# Patient Record
Sex: FEMALE | Race: OTHER | Employment: UNEMPLOYED
[De-identification: names, ages, dates, MRNs, and addresses within clinical notes are randomized per-mention and may not be internally consistent; named-entity substitution may affect disease eponyms.]

---

## 2017-01-26 ENCOUNTER — OFFICE VISIT (OUTPATIENT)
Dept: FAMILY MEDICINE CLINIC | Facility: CLINIC | Age: 62
End: 2017-01-26

## 2017-01-26 VITALS
HEIGHT: 61 IN | SYSTOLIC BLOOD PRESSURE: 140 MMHG | WEIGHT: 146 LBS | TEMPERATURE: 97.5 F | DIASTOLIC BLOOD PRESSURE: 80 MMHG | HEART RATE: 95 BPM | BODY MASS INDEX: 27.56 KG/M2

## 2017-01-26 DIAGNOSIS — J30.9 CHRONIC ALLERGIC RHINITIS: Primary | ICD-10-CM

## 2017-01-26 DIAGNOSIS — G47.33 OSA ON CPAP: ICD-10-CM

## 2017-01-26 DIAGNOSIS — Z99.89 OSA ON CPAP: ICD-10-CM

## 2017-01-26 DIAGNOSIS — G47.9 SLEEP DISORDER: ICD-10-CM

## 2017-01-26 DIAGNOSIS — R05.3 PERSISTENT COUGH: ICD-10-CM

## 2017-01-26 PROCEDURE — 99213 OFFICE O/P EST LOW 20 MIN: CPT | Performed by: FAMILY MEDICINE

## 2017-01-26 RX ORDER — BENZONATATE 100 MG/1
100 CAPSULE ORAL 3 TIMES DAILY PRN
Qty: 30 CAPSULE | Refills: 1 | Status: SHIPPED | OUTPATIENT
Start: 2017-01-26 | End: 2017-02-02

## 2017-01-26 ASSESSMENT — PATIENT HEALTH QUESTIONNAIRE - PHQ9
SUM OF ALL RESPONSES TO PHQ QUESTIONS 1-9: 0
2. FEELING DOWN, DEPRESSED OR HOPELESS: 0
SUM OF ALL RESPONSES TO PHQ9 QUESTIONS 1 & 2: 0
1. LITTLE INTEREST OR PLEASURE IN DOING THINGS: 0

## 2017-01-28 ASSESSMENT — ENCOUNTER SYMPTOMS
RHINORRHEA: 1
DIARRHEA: 0
ABDOMINAL PAIN: 0
SORE THROAT: 0
SHORTNESS OF BREATH: 0
CONSTIPATION: 0
CHEST TIGHTNESS: 0
COUGH: 1
BACK PAIN: 0
NAUSEA: 0
TROUBLE SWALLOWING: 0

## 2017-03-22 ENCOUNTER — OFFICE VISIT (OUTPATIENT)
Dept: FAMILY MEDICINE CLINIC | Age: 62
End: 2017-03-22
Payer: COMMERCIAL

## 2017-03-22 VITALS
BODY MASS INDEX: 27.68 KG/M2 | DIASTOLIC BLOOD PRESSURE: 77 MMHG | SYSTOLIC BLOOD PRESSURE: 120 MMHG | HEIGHT: 61 IN | HEART RATE: 100 BPM | WEIGHT: 146.6 LBS

## 2017-03-22 DIAGNOSIS — F43.9 STRESS: ICD-10-CM

## 2017-03-22 DIAGNOSIS — G47.33 OSA ON CPAP: ICD-10-CM

## 2017-03-22 DIAGNOSIS — H92.01 OTALGIA, RIGHT: Primary | ICD-10-CM

## 2017-03-22 DIAGNOSIS — E66.09 NON MORBID OBESITY DUE TO EXCESS CALORIES: ICD-10-CM

## 2017-03-22 DIAGNOSIS — J30.89 PERENNIAL ALLERGIC RHINITIS, UNSPECIFIED ALLERGIC RHINITIS TRIGGER: ICD-10-CM

## 2017-03-22 DIAGNOSIS — G47.9 SLEEP DISORDER: ICD-10-CM

## 2017-03-22 DIAGNOSIS — Z99.89 OSA ON CPAP: ICD-10-CM

## 2017-03-22 DIAGNOSIS — R51.9 HEADACHE AROUND THE EYES: ICD-10-CM

## 2017-03-22 PROCEDURE — 99213 OFFICE O/P EST LOW 20 MIN: CPT | Performed by: FAMILY MEDICINE

## 2017-03-22 ASSESSMENT — PATIENT HEALTH QUESTIONNAIRE - PHQ9
SUM OF ALL RESPONSES TO PHQ9 QUESTIONS 1 & 2: 0
2. FEELING DOWN, DEPRESSED OR HOPELESS: 0
1. LITTLE INTEREST OR PLEASURE IN DOING THINGS: 0
SUM OF ALL RESPONSES TO PHQ QUESTIONS 1-9: 0

## 2017-03-31 PROBLEM — E66.09 NON MORBID OBESITY DUE TO EXCESS CALORIES: Status: ACTIVE | Noted: 2017-03-31

## 2017-03-31 ASSESSMENT — ENCOUNTER SYMPTOMS
SHORTNESS OF BREATH: 0
SINUS PRESSURE: 1
RHINORRHEA: 1
ABDOMINAL PAIN: 0
NAUSEA: 0
CONSTIPATION: 0
DIARRHEA: 0
VOICE CHANGE: 1
CHEST TIGHTNESS: 0
BACK PAIN: 0
COUGH: 1
SORE THROAT: 0
TROUBLE SWALLOWING: 0

## 2017-04-12 DIAGNOSIS — R51.9 HEADACHE AROUND THE EYES: ICD-10-CM

## 2017-04-24 ENCOUNTER — OFFICE VISIT (OUTPATIENT)
Dept: NEUROLOGY | Age: 62
End: 2017-04-24
Payer: COMMERCIAL

## 2017-04-24 VITALS
HEART RATE: 59 BPM | HEIGHT: 61 IN | WEIGHT: 146 LBS | SYSTOLIC BLOOD PRESSURE: 133 MMHG | DIASTOLIC BLOOD PRESSURE: 74 MMHG | BODY MASS INDEX: 27.56 KG/M2

## 2017-04-24 DIAGNOSIS — R51.9 HEADACHE AROUND THE EYES: Primary | ICD-10-CM

## 2017-04-24 PROCEDURE — 99213 OFFICE O/P EST LOW 20 MIN: CPT | Performed by: PSYCHIATRY & NEUROLOGY

## 2017-04-24 RX ORDER — CYPROHEPTADINE HYDROCHLORIDE 4 MG/1
4 TABLET ORAL 3 TIMES DAILY
Qty: 90 TABLET | Refills: 3 | Status: SHIPPED | OUTPATIENT
Start: 2017-04-24 | End: 2017-09-15 | Stop reason: SDUPTHER

## 2017-04-24 RX ORDER — LORATADINE 10 MG/1
10 TABLET ORAL DAILY
COMMUNITY
End: 2018-10-23

## 2017-06-19 DIAGNOSIS — R51.9 HEADACHE AROUND THE EYES: ICD-10-CM

## 2017-06-26 ENCOUNTER — OFFICE VISIT (OUTPATIENT)
Dept: FAMILY MEDICINE CLINIC | Age: 62
End: 2017-06-26
Payer: COMMERCIAL

## 2017-06-26 DIAGNOSIS — H69.93 EUSTACHIAN TUBE DISORDER, BILATERAL: ICD-10-CM

## 2017-06-26 DIAGNOSIS — E66.09 NON MORBID OBESITY DUE TO EXCESS CALORIES: ICD-10-CM

## 2017-06-26 DIAGNOSIS — F41.9 ANXIETY: ICD-10-CM

## 2017-06-26 DIAGNOSIS — G47.9 SLEEP DISORDER: ICD-10-CM

## 2017-06-26 DIAGNOSIS — J31.0 RHINITIS, CHRONIC: Primary | ICD-10-CM

## 2017-06-26 DIAGNOSIS — G47.33 OSA ON CPAP: ICD-10-CM

## 2017-06-26 DIAGNOSIS — Z99.89 OSA ON CPAP: ICD-10-CM

## 2017-06-26 PROCEDURE — 99213 OFFICE O/P EST LOW 20 MIN: CPT | Performed by: FAMILY MEDICINE

## 2017-06-26 ASSESSMENT — PATIENT HEALTH QUESTIONNAIRE - PHQ9
2. FEELING DOWN, DEPRESSED OR HOPELESS: 0
SUM OF ALL RESPONSES TO PHQ9 QUESTIONS 1 & 2: 0
1. LITTLE INTEREST OR PLEASURE IN DOING THINGS: 0
SUM OF ALL RESPONSES TO PHQ QUESTIONS 1-9: 0

## 2017-07-12 ENCOUNTER — OFFICE VISIT (OUTPATIENT)
Dept: NEUROLOGY | Age: 62
End: 2017-07-12
Payer: COMMERCIAL

## 2017-07-12 VITALS
BODY MASS INDEX: 29.23 KG/M2 | HEART RATE: 62 BPM | SYSTOLIC BLOOD PRESSURE: 138 MMHG | WEIGHT: 154.8 LBS | DIASTOLIC BLOOD PRESSURE: 75 MMHG | HEIGHT: 61 IN

## 2017-07-12 DIAGNOSIS — R51.9 HEADACHE AROUND THE EYES: Primary | ICD-10-CM

## 2017-07-12 PROCEDURE — 99213 OFFICE O/P EST LOW 20 MIN: CPT | Performed by: PSYCHIATRY & NEUROLOGY

## 2017-07-14 ASSESSMENT — ENCOUNTER SYMPTOMS
CONSTIPATION: 0
NAUSEA: 0
DIARRHEA: 0
TROUBLE SWALLOWING: 0
BACK PAIN: 0
SHORTNESS OF BREATH: 1
ABDOMINAL PAIN: 0
CHEST TIGHTNESS: 0
RHINORRHEA: 0
SORE THROAT: 0
COUGH: 1

## 2017-09-15 DIAGNOSIS — R51.9 HEADACHE AROUND THE EYES: ICD-10-CM

## 2017-09-18 RX ORDER — CYPROHEPTADINE HYDROCHLORIDE 4 MG/1
4 TABLET ORAL 3 TIMES DAILY
Qty: 90 TABLET | Refills: 2 | OUTPATIENT
Start: 2017-09-18 | End: 2017-12-28 | Stop reason: ALTCHOICE

## 2017-10-24 ENCOUNTER — OFFICE VISIT (OUTPATIENT)
Dept: FAMILY MEDICINE CLINIC | Age: 62
End: 2017-10-24
Payer: COMMERCIAL

## 2017-10-24 VITALS
HEART RATE: 63 BPM | BODY MASS INDEX: 28.77 KG/M2 | DIASTOLIC BLOOD PRESSURE: 73 MMHG | WEIGHT: 152.4 LBS | HEIGHT: 61 IN | SYSTOLIC BLOOD PRESSURE: 131 MMHG

## 2017-10-24 DIAGNOSIS — E66.09 NON MORBID OBESITY DUE TO EXCESS CALORIES: ICD-10-CM

## 2017-10-24 DIAGNOSIS — J30.1 CHRONIC SEASONAL ALLERGIC RHINITIS DUE TO POLLEN: Primary | ICD-10-CM

## 2017-10-24 DIAGNOSIS — Z99.89 OSA ON CPAP: ICD-10-CM

## 2017-10-24 DIAGNOSIS — H69.83 DYSFUNCTION OF BOTH EUSTACHIAN TUBES: ICD-10-CM

## 2017-10-24 DIAGNOSIS — G47.33 OSA ON CPAP: ICD-10-CM

## 2017-10-24 PROCEDURE — G8484 FLU IMMUNIZE NO ADMIN: HCPCS | Performed by: FAMILY MEDICINE

## 2017-10-24 PROCEDURE — 1036F TOBACCO NON-USER: CPT | Performed by: FAMILY MEDICINE

## 2017-10-24 PROCEDURE — G8417 CALC BMI ABV UP PARAM F/U: HCPCS | Performed by: FAMILY MEDICINE

## 2017-10-24 PROCEDURE — 3017F COLORECTAL CA SCREEN DOC REV: CPT | Performed by: FAMILY MEDICINE

## 2017-10-24 PROCEDURE — 3014F SCREEN MAMMO DOC REV: CPT | Performed by: FAMILY MEDICINE

## 2017-10-24 PROCEDURE — 99213 OFFICE O/P EST LOW 20 MIN: CPT | Performed by: FAMILY MEDICINE

## 2017-10-24 PROCEDURE — G8427 DOCREV CUR MEDS BY ELIG CLIN: HCPCS | Performed by: FAMILY MEDICINE

## 2017-10-24 ASSESSMENT — ENCOUNTER SYMPTOMS
COUGH: 0
BACK PAIN: 0
ABDOMINAL PAIN: 0
RHINORRHEA: 1
CHEST TIGHTNESS: 0
CONSTIPATION: 0
VOICE CHANGE: 0
SHORTNESS OF BREATH: 0
SORE THROAT: 0
DIARRHEA: 0
NAUSEA: 0
TROUBLE SWALLOWING: 0

## 2017-10-24 NOTE — PROGRESS NOTES
[address]  [facility] [address]  Dept: [phone]  Dept Fax: [phone]    Fredy Rutledge is a 63 y.o. female who presents today for her medical conditions/complaints as noted below. Fredy Rutledge is c/o of Allergic Rhinitis  (pt here for 4 mth follow-up of chronic rhinitis)      HPI:     Patient is here for follow-up of her medical problems. Patient states she has been feeling well. She is taking her medications. She is seeing her allergist.  Denies any headaches or dizziness. No chest pain. Weight is up 6 pounds. Blood pressure is stable. Nonsmoker. Denies alcohol. Social History   Substance Use Topics    Smoking status: Never Smoker    Smokeless tobacco: Never Used    Alcohol use No      Current Outpatient Prescriptions   Medication Sig Dispense Refill    cyproheptadine (PERIACTIN) 4 MG tablet Take 1 tablet by mouth 3 times daily 90 tablet 2    loratadine (CLARITIN) 10 MG tablet Take 10 mg by mouth daily       No current facility-administered medications for this visit. No Known Allergies      Subjective:      Review of Systems   Constitutional: Negative for chills, diaphoresis, fatigue and fever. HENT: Positive for congestion and rhinorrhea. Negative for ear pain, hearing loss, sore throat, trouble swallowing and voice change. Eyes: Negative for visual disturbance. Respiratory: Negative for cough, chest tightness and shortness of breath. Cardiovascular: Negative for chest pain, palpitations and leg swelling. Gastrointestinal: Negative for abdominal pain, constipation, diarrhea and nausea. Endocrine: Negative for polydipsia and polyuria. Genitourinary: Negative for difficulty urinating, dysuria and hematuria. Musculoskeletal: Negative for arthralgias, back pain, gait problem and neck pain. Skin: Negative for rash. Neurological: Negative for dizziness, light-headedness and headaches.

## 2017-10-24 NOTE — PROGRESS NOTES
Visit Information    Have you changed or started any medications since your last visit including any over-the-counter medicines, vitamins, or herbal medicines? no   Are you having any side effects from any of your medications? -  no  Have you stopped taking any of your medications? Is so, why? -  no    Have you seen any other physician or provider since your last visit? Yes - Records Obtained  Have you had any other diagnostic tests since your last visit? No  Have you been seen in the emergency room and/or had an admission to a hospital since we last saw you? No  Have you had your routine dental cleaning in the past 6 months? no    Have you activated your [employer] account? If not, what are your barriers?  Yes     Patient Care Team:  Tangela Estrada MD as PCP - General (Family Medicine)    Medical History Review  Past Medical, Family, and Social History reviewed and does contribute to the patient presenting condition    Health Maintenance   Topic Date Due    Diabetes screen  03/04/1997    Colon cancer screen colonoscopy  03/04/2007    Zostavax vaccine  03/04/2017    Flu vaccine (1) 12/19/2017 (Originally 9/1/2017)    HIV screen  03/26/2018 (Originally 3/4/1972)    DTaP/Tdap/Td vaccine (1 - Tdap) 06/23/2018 (Originally 3/4/1976)    Lipid screen  06/26/2018 (Originally 3/4/1997)    Hepatitis C screen  06/26/2018 (Originally 1955)    Breast cancer screen  03/10/2018    Cervical cancer screen  03/30/2018

## 2017-12-28 ENCOUNTER — OFFICE VISIT (OUTPATIENT)
Dept: NEUROLOGY | Age: 62
End: 2017-12-28
Payer: COMMERCIAL

## 2017-12-28 VITALS
BODY MASS INDEX: 29.11 KG/M2 | HEIGHT: 61 IN | WEIGHT: 154.2 LBS | DIASTOLIC BLOOD PRESSURE: 70 MMHG | HEART RATE: 63 BPM | SYSTOLIC BLOOD PRESSURE: 135 MMHG

## 2017-12-28 DIAGNOSIS — G44.099 TRIGEMINAL AUTONOMIC CEPHALGIAS: ICD-10-CM

## 2017-12-28 DIAGNOSIS — G47.33 OSA (OBSTRUCTIVE SLEEP APNEA): Primary | ICD-10-CM

## 2017-12-28 PROCEDURE — 3017F COLORECTAL CA SCREEN DOC REV: CPT | Performed by: PSYCHIATRY & NEUROLOGY

## 2017-12-28 PROCEDURE — G8417 CALC BMI ABV UP PARAM F/U: HCPCS | Performed by: PSYCHIATRY & NEUROLOGY

## 2017-12-28 PROCEDURE — 3014F SCREEN MAMMO DOC REV: CPT | Performed by: PSYCHIATRY & NEUROLOGY

## 2017-12-28 PROCEDURE — 1036F TOBACCO NON-USER: CPT | Performed by: PSYCHIATRY & NEUROLOGY

## 2017-12-28 PROCEDURE — G8427 DOCREV CUR MEDS BY ELIG CLIN: HCPCS | Performed by: PSYCHIATRY & NEUROLOGY

## 2017-12-28 PROCEDURE — 99213 OFFICE O/P EST LOW 20 MIN: CPT | Performed by: PSYCHIATRY & NEUROLOGY

## 2017-12-28 PROCEDURE — G8484 FLU IMMUNIZE NO ADMIN: HCPCS | Performed by: PSYCHIATRY & NEUROLOGY

## 2018-03-04 ENCOUNTER — APPOINTMENT (OUTPATIENT)
Dept: GENERAL RADIOLOGY | Age: 63
End: 2018-03-04
Payer: COMMERCIAL

## 2018-03-04 ENCOUNTER — HOSPITAL ENCOUNTER (EMERGENCY)
Age: 63
Discharge: HOME | End: 2018-03-04
Payer: COMMERCIAL

## 2018-03-04 VITALS
BODY MASS INDEX: 28.47 KG/M2 | SYSTOLIC BLOOD PRESSURE: 148 MMHG | DIASTOLIC BLOOD PRESSURE: 81 MMHG | RESPIRATION RATE: 14 BRPM | OXYGEN SATURATION: 100 % | HEIGHT: 61 IN | WEIGHT: 150.79 LBS | TEMPERATURE: 97.9 F | HEART RATE: 65 BPM

## 2018-03-04 DIAGNOSIS — M25.561 RIGHT KNEE PAIN, UNSPECIFIED CHRONICITY: Primary | ICD-10-CM

## 2018-03-04 DIAGNOSIS — M25.461 KNEE EFFUSION, RIGHT: ICD-10-CM

## 2018-03-04 PROCEDURE — 99283 EMERGENCY DEPT VISIT LOW MDM: CPT

## 2018-03-04 PROCEDURE — 73562 X-RAY EXAM OF KNEE 3: CPT

## 2018-03-04 RX ORDER — IBUPROFEN 800 MG/1
800 TABLET ORAL EVERY 8 HOURS PRN
Qty: 15 TABLET | Refills: 0 | Status: SHIPPED | OUTPATIENT
Start: 2018-03-04 | End: 2018-09-25 | Stop reason: SDUPTHER

## 2018-03-04 ASSESSMENT — ENCOUNTER SYMPTOMS
SHORTNESS OF BREATH: 0
COLOR CHANGE: 0
COUGH: 0

## 2018-03-04 ASSESSMENT — PAIN SCALES - GENERAL: PAINLEVEL_OUTOF10: 5

## 2018-03-04 NOTE — ED PROVIDER NOTES
DISPOSITION Decision To Discharge 03/04/2018 06:50:45 PM      PATIENT REFERRED TO:   [facility] [address] [facility], [address] [facility] [address]  Damari 6957, DO  [address] [place] [address] [facility]  [phone]    Schedule an appointment as soon as possible for a visit       [facility] ED  [address] [facility]  [phone]    If symptoms worsen      DISCHARGE MEDICATIONS:     Discharge Medication List as of 3/4/2018  6:51 PM      START taking these medications    Details   ibuprofen (ADVIL;MOTRIN) 800 MG tablet Take 1 tablet by mouth every 8 hours as needed for Pain or Fever, Disp-15 tablet, R-0Print                 (Please note that portions of this note were completed with a voice recognition program.  Efforts were made to edit the dictations but occasionally words are mis-transcribed.)    LUCI Goff NP  03/04/18 [id number]

## 2018-03-08 ENCOUNTER — OFFICE VISIT (OUTPATIENT)
Dept: FAMILY MEDICINE CLINIC | Age: 63
End: 2018-03-08
Payer: COMMERCIAL

## 2018-03-08 VITALS
BODY MASS INDEX: 28.25 KG/M2 | HEIGHT: 61 IN | SYSTOLIC BLOOD PRESSURE: 117 MMHG | HEART RATE: 69 BPM | DIASTOLIC BLOOD PRESSURE: 68 MMHG | WEIGHT: 149.6 LBS

## 2018-03-08 DIAGNOSIS — M25.561 ACUTE PAIN OF RIGHT KNEE: Primary | ICD-10-CM

## 2018-03-08 PROCEDURE — 99213 OFFICE O/P EST LOW 20 MIN: CPT | Performed by: PHYSICIAN ASSISTANT

## 2018-03-08 ASSESSMENT — ENCOUNTER SYMPTOMS
VOMITING: 0
SHORTNESS OF BREATH: 0
COUGH: 1
NAUSEA: 0
BACK PAIN: 0

## 2018-03-08 NOTE — PROGRESS NOTES
Vitals:    03/08/18 1610   BP: 117/68   Site: Left Arm   Position: Sitting   Cuff Size: Medium Adult   Pulse: 69   Weight: 149 lb 9.6 oz (67.9 kg)   Height: 5' 0.98\" (1.549 m)       GENERAL PHYSICAL EXAM     Physical Exam   Constitutional: She is oriented to person, place, and time and well-developed, well-nourished, and in no distress. HENT:   Head: Normocephalic and atraumatic. Eyes: Pupils are equal, round, and reactive to light. Cardiovascular: Normal rate, regular rhythm and normal heart sounds. Pulmonary/Chest: Effort normal and breath sounds normal.   Abdominal: Soft. She exhibits no mass. There is no tenderness. Musculoskeletal: Normal range of motion. She exhibits no edema. Right knee: She exhibits swelling and bony tenderness. Tenderness found. Neurological: She is alert and oriented to person, place, and time. Gait normal.   Skin: Skin is warm and dry. Assessment     1. Acute pain of right knee         Plan      Long discussion with patient, Patient is here for follow-up for ER visit for right knee pain, x-ray was negative, 3 out of 10 intensity, patient states pain medication has provided significant relief to right knee pain. Findings were explained, blood pressure stable, right knee edema and tenderness. Plan of treatment, discussed right knee pain. Instructed patient to continue take pain medication to control pain, ice knee and elevate leg. Instructed patient to contact Ortho and right knee pain does not improve. Her medications were reviewed. Labs reviewed. We will see patient back in about 6 months or earlier if any problems. No orders of the defined types were placed in this encounter.     Outpatient Encounter Prescriptions as of 3/8/2018   Medication Sig Dispense Refill    ibuprofen (ADVIL;MOTRIN) 800 MG tablet Take 1 tablet by mouth every 8 hours as needed for Pain or Fever 15 tablet 0    Omega-3 Fatty Acids (OMEGA 3 PO) Take by mouth daily      CALCIUM PO Take by mouth daily      MULTIPLE VITAMIN PO Take by mouth daily      loratadine (CLARITIN) 10 MG tablet Take 10 mg by mouth daily       No facility-administered encounter medications on file as of 3/8/2018. Medications, laboratory testing, imaging, consultation, and follow up as documented in this encounter.      Electronically signed by Hakeem Duke PA-C on 3/8/18 at 4:52 PM

## 2018-03-08 NOTE — PATIENT INSTRUCTIONS
Patient Education        Chronic Sinusitis: Care Instructions  Your Care Instructions    Sinusitis is an infection of the lining of the sinus cavities in your head. It causes pain and pressure in your head and face. Sinusitis can be short-term (acute) or long-term (chronic). Chronic sinusitis lasts 12 weeks or longer. It is often caused by a bacterial or fungal infection. Other things, such as allergies, may also be involved. Chronic sinusitis may be hard to treat. It can lead to permanent changes in the mucous membranes that line the sinuses. It may make future sinus infections more likely. The infection may take some time to treat. Antibiotics are usually used if the infection is caused by bacteria. You may also need to use a corticosteroid nasal spray. If the infection is not cured after you try two or more different antibiotics, you may want to talk with your doctor about surgery or allergy testing. If the sinusitis is caused by a fungal infection, you may need to take antifungals or other medicines. You may also need surgery. Follow-up care is a key part of your treatment and safety. Be sure to make and go to all appointments, and call your doctor if you are having problems. It's also a good idea to know your test results and keep a list of the medicines you take. How can you care for yourself at home? Medicines  ? · Be safe with medicines. Take your medicines exactly as prescribed. Call your doctor if you think you are having a problem with your medicine. You will get more details on the specific medicines your doctor prescribes. ? · Take your antibiotics as directed. Do not stop taking them just because you feel better. You need to take the full course of antibiotics. ? · Your doctor may recommend a corticosteroid nasal spray, wash, drops, or pills. Take this medicine exactly as prescribed. ?At home  ? · Breathe warm, moist air.  You can use a steamy shower, a hot bath, or a sink filled with hot water. Avoid cold, dry air. Using a humidifier in your home may help. Follow the instructions for cleaning the machine. ? · Use saline (saltwater) nasal washes every day. This helps keep your nasal passages open. It also can wash out mucus and bacteria. ¨ You can buy saline nose drops at a grocery store or drugstore. ¨ You can make your own at home. Add 1 teaspoon of salt and 1 teaspoon of baking soda to 2 cups of distilled water. If you make your own, fill a bulb syringe with the solution. Then insert the tip into your nostril and squeeze gently. Chava Lute your nose. ? · Put a warm, wet towel or a warm gel pack on your face 3 or 4 times a day. Leave it on 5 to 10 minutes each time. ? · Do not smoke or breathe secondhand smoke. Smoking can make sinusitis worse. If you need help quitting, talk to your doctor about stop-smoking programs and medicines. These can increase your chances of quitting for good. When should you call for help? Call your doctor now or seek immediate medical care if:  ? · You have new or worse symptoms of infection, such as:  ¨ Increased pain, swelling, warmth, or redness. ¨ Red streaks leading from the area. ¨ Pus draining from the area. ¨ A fever. ? Watch closely for changes in your health, and be sure to contact your doctor if:  ? · The mucus from your nose becomes thicker (like pus) or has new blood in it. ? · You do not get better as expected. Where can you learn more? Go to [email or web address]://ACTION SPORTSpeYi De.Tamarac. org and sign in to your "[employer] account. Enter H033 in the MyLikes box to learn more about \"Chronic Sinusitis: Care Instructions. \"     If you do not have an account, please click on the \"Sign Up Now\" link. Current as of: May 12, 2017  Content Version: 11.5  © 4820-1658 Healthwise, Incorporated. Care instructions adapted under license by [facility] ([facility]).  If you have questions about a medical condition or this instruction, always ask your healthcare professional. [address] any warranty or liability for your use of this information.

## 2018-05-11 ENCOUNTER — TELEPHONE (OUTPATIENT)
Dept: FAMILY MEDICINE CLINIC | Age: 63
End: 2018-05-11

## 2018-05-14 ENCOUNTER — TELEPHONE (OUTPATIENT)
Dept: FAMILY MEDICINE CLINIC | Age: 63
End: 2018-05-14

## 2018-07-18 ENCOUNTER — TELEPHONE (OUTPATIENT)
Dept: FAMILY MEDICINE CLINIC | Age: 63
End: 2018-07-18

## 2018-08-29 ENCOUNTER — OFFICE VISIT (OUTPATIENT)
Dept: FAMILY MEDICINE CLINIC | Age: 63
End: 2018-08-29
Payer: COMMERCIAL

## 2018-08-29 VITALS
HEIGHT: 61 IN | RESPIRATION RATE: 17 BRPM | BODY MASS INDEX: 25.86 KG/M2 | HEART RATE: 83 BPM | OXYGEN SATURATION: 98 % | WEIGHT: 137 LBS | DIASTOLIC BLOOD PRESSURE: 70 MMHG | TEMPERATURE: 99.7 F | SYSTOLIC BLOOD PRESSURE: 126 MMHG

## 2018-08-29 DIAGNOSIS — J01.90 ACUTE NON-RECURRENT SINUSITIS, UNSPECIFIED LOCATION: Primary | ICD-10-CM

## 2018-08-29 DIAGNOSIS — R04.2 COUGH WITH HEMOPTYSIS: ICD-10-CM

## 2018-08-29 PROCEDURE — G8427 DOCREV CUR MEDS BY ELIG CLIN: HCPCS | Performed by: NURSE PRACTITIONER

## 2018-08-29 PROCEDURE — 1036F TOBACCO NON-USER: CPT | Performed by: NURSE PRACTITIONER

## 2018-08-29 PROCEDURE — 3017F COLORECTAL CA SCREEN DOC REV: CPT | Performed by: NURSE PRACTITIONER

## 2018-08-29 PROCEDURE — G8417 CALC BMI ABV UP PARAM F/U: HCPCS | Performed by: NURSE PRACTITIONER

## 2018-08-29 PROCEDURE — 99201 PR OFFICE OUTPATIENT NEW 10 MINUTES: CPT | Performed by: NURSE PRACTITIONER

## 2018-08-29 RX ORDER — BENZONATATE 100 MG/1
100 CAPSULE ORAL 3 TIMES DAILY PRN
Qty: 20 CAPSULE | Refills: 0 | Status: SHIPPED | OUTPATIENT
Start: 2018-08-29 | End: 2018-09-16

## 2018-08-29 RX ORDER — AMOXICILLIN AND CLAVULANATE POTASSIUM 875; 125 MG/1; MG/1
1 TABLET, FILM COATED ORAL 2 TIMES DAILY
Qty: 20 TABLET | Refills: 0 | Status: SHIPPED | OUTPATIENT
Start: 2018-08-29 | End: 2018-09-08

## 2018-08-29 ASSESSMENT — ENCOUNTER SYMPTOMS
WHEEZING: 1
VOMITING: 0
SINUS PAIN: 1
DIARRHEA: 0
SHORTNESS OF BREATH: 0
COUGH: 1
SINUS PRESSURE: 1

## 2018-08-29 ASSESSMENT — PATIENT HEALTH QUESTIONNAIRE - PHQ9
2. FEELING DOWN, DEPRESSED OR HOPELESS: 0
SUM OF ALL RESPONSES TO PHQ QUESTIONS 1-9: 0
SUM OF ALL RESPONSES TO PHQ QUESTIONS 1-9: 0
1. LITTLE INTEREST OR PLEASURE IN DOING THINGS: 0
SUM OF ALL RESPONSES TO PHQ9 QUESTIONS 1 & 2: 0

## 2018-08-29 NOTE — PROGRESS NOTES
[address] [address]  [place] [address]  Dept: [phone]  Dept Fax: [phone]    Altagracia Aguilar is a 66 y.o. female who presents to the urgent care today for her medical conditions/complaints as noted below. Altagracia Aguilar is c/o of Cough (x 1 week, worsening , thick bloody sputum); Chest Congestion (x 1 week ); Wheezing (x 1 week ); Nasal Congestion (x 1 week, clear drainage ); Ear Fullness (Bilateral ); and Headache (congestion)      HPI:     Patient states she has sinus pressure, ear pain, cough for one week. States she feels facial pressure, sinus headache. States she has post nasal drip and hacks up yellow, bloody drainage in am.  States does not feel like it was from chest  Denies fever, wt loss, night sweats, leg swelling  Bleeding disorder, cp, sob, nv, diarrhea  Denies rash, recent travel, back pain  Feels like she has a sinus infection. States it is in her sinuses, not as much in chest      Cough   This is a new problem. The current episode started in the past 7 days. The problem has been waxing and waning. The cough is productive of sputum. Associated symptoms include ear congestion, ear pain, headaches, nasal congestion, postnasal drip and wheezing. Pertinent negatives include no chest pain, chills, fever, rash or shortness of breath. The symptoms are aggravated by lying down (worse in am). She has tried nothing for the symptoms. Wheezing    Associated symptoms include coughing, ear pain and headaches. Pertinent negatives include no chest pain, chills, diarrhea, fever, neck pain, rash, shortness of breath or vomiting. Ear Fullness    Associated symptoms include coughing and headaches. Pertinent negatives include no diarrhea, neck pain, rash or vomiting. Headache    Associated symptoms include coughing, ear pain and sinus pressure. Pertinent negatives include no dizziness, fever, neck pain or vomiting.        Past xray ordered  You need rechecked tomorrow  Go to er for coughing up blood, fever, chills, chest pain, short of breath, worsening, change or concern  Follow up with primary care, call in am    Return for follow up with primary care in one day, go to the ER for worsening, change or concern. Orders Placed This Encounter   Medications    amoxicillin-clavulanate (AUGMENTIN) 875-125 MG per tablet     Sig: Take 1 tablet by mouth 2 times daily for 10 days     Dispense:  20 tablet     Refill:  0    benzonatate (TESSALON PERLES) 100 MG capsule     Sig: Take 1 capsule by mouth 3 times daily as needed for Cough     Dispense:  20 capsule     Refill:  0         Patient given educational materials - see patient instructions. Discussed use, benefit, and side effects of prescribed medications. All patient questions answered. Pt voiced understanding.     Electronically signed by SOL Raphael CNP on 8/29/2018 at 7:38 PM

## 2018-08-29 NOTE — PATIENT INSTRUCTIONS
drugstore. Or you can make your own at home by adding 1 teaspoon of salt and 1 teaspoon of baking soda to 2 cups of distilled water. If you make your own, fill a bulb syringe with the solution, insert the tip into your nostril, and squeeze gently. Herbie Branch your nose. · Put a hot, wet towel or a warm gel pack on your face 3 or 4 times a day for 5 to 10 minutes each time. · Try a decongestant nasal spray like oxymetazoline (Afrin). Do not use it for more than 3 days in a row. Using it for more than 3 days can make your congestion worse. When should you call for help? Call your doctor now or seek immediate medical care if:    · You have new or worse swelling or redness in your face or around your eyes.     · You have a new or higher fever.    Watch closely for changes in your health, and be sure to contact your doctor if:    · You have new or worse facial pain.     · The mucus from your nose becomes thicker (like pus) or has new blood in it.     · You are not getting better as expected. Where can you learn more? Go to https://Sagence.RoboteX. org and sign in to your [employer]by [employer]) account. Enter Y010 in the [employer] [facility] box to learn more about \"Sinusitis: Care Instructions. \"     If you do not have an account, please click on the \"Sign Up Now\" link. Current as of: May 12, 2017  Content Version: 11.7  © 9730-5371 [employer]. Care instructions adapted under license by [facility] ([facility]). If you have questions about a medical condition or this instruction, always ask your healthcare professional. [address] [address] any warranty or liability for your use of this information. Patient Education        Coughing Up Blood: Care Instructions  Your Care Instructions    Coughing up blood can be frightening. The blood may come from the lungs, stomach, or throat. You may cough up a few thin streaks of bright red blood. This probably is not a cause for concern.  Coughing up large call for help? Call 911 anytime you think you may need emergency care. For example, call if:    · You have sudden chest pain and shortness of breath.     · You have severe trouble breathing.    Call your doctor now or seek immediate medical care if:    · You have wheezing and difficulty breathing.     · You are dizzy or lightheaded, or you feel like you may faint.     · You cough up clots of blood.    Watch closely for changes in your health, and be sure to contact your doctor if:    · You do not get better as expected.     · You have any new symptoms, such as chest pain with difficulty breathing or a fever. Where can you learn more? Go to https://GMG33.Instaradio. org and sign in to your [employer] account. Enter M550 in the EnhanCV box to learn more about \"Coughing Up Blood: Care Instructions. \"     If you do not have an account, please click on the \"Sign Up Now\" link. Current as of: November 20, 2017  Content Version: 11.7  © 8367-9950 [employer]raSpan, Incorporated. Care instructions adapted under license by [facility] [address] If you have questions about a medical condition or this instruction, always ask your healthcare professional. [address] [address] any warranty or liability for your use of this information.

## 2018-09-16 ENCOUNTER — HOSPITAL ENCOUNTER (EMERGENCY)
Age: 63
Discharge: HOME | End: 2018-09-16
Attending: EMERGENCY MEDICINE
Payer: COMMERCIAL

## 2018-09-16 VITALS
DIASTOLIC BLOOD PRESSURE: 61 MMHG | WEIGHT: 138 LBS | OXYGEN SATURATION: 97 % | SYSTOLIC BLOOD PRESSURE: 153 MMHG | BODY MASS INDEX: 26.06 KG/M2 | TEMPERATURE: 97.8 F | RESPIRATION RATE: 16 BRPM | HEIGHT: 61 IN | HEART RATE: 92 BPM

## 2018-09-16 DIAGNOSIS — J06.9 UPPER RESPIRATORY TRACT INFECTION, UNSPECIFIED TYPE: ICD-10-CM

## 2018-09-16 DIAGNOSIS — H66.91 RIGHT OTITIS MEDIA, UNSPECIFIED OTITIS MEDIA TYPE: Primary | ICD-10-CM

## 2018-09-16 PROCEDURE — 99283 EMERGENCY DEPT VISIT LOW MDM: CPT

## 2018-09-16 RX ORDER — CEFDINIR 300 MG/1
300 CAPSULE ORAL 2 TIMES DAILY
Qty: 20 CAPSULE | Refills: 0 | Status: SHIPPED | OUTPATIENT
Start: 2018-09-16 | End: 2018-09-26

## 2018-09-16 RX ORDER — FLUTICASONE PROPIONATE 50 MCG
1 SPRAY, SUSPENSION (ML) NASAL DAILY
Qty: 1 BOTTLE | Refills: 0 | Status: SHIPPED | OUTPATIENT
Start: 2018-09-16 | End: 2019-03-08 | Stop reason: ALTCHOICE

## 2018-09-16 RX ORDER — PROMETHAZINE HYDROCHLORIDE 6.25 MG/5ML
6.25 SYRUP ORAL 4 TIMES DAILY PRN
Qty: 140 ML | Refills: 0 | Status: SHIPPED | OUTPATIENT
Start: 2018-09-16 | End: 2018-09-25 | Stop reason: SDUPTHER

## 2018-09-16 RX ORDER — BENZONATATE 200 MG/1
200 CAPSULE ORAL 3 TIMES DAILY PRN
Qty: 30 CAPSULE | Refills: 0 | Status: SHIPPED | OUTPATIENT
Start: 2018-09-16 | End: 2018-09-25 | Stop reason: SDUPTHER

## 2018-09-16 ASSESSMENT — ENCOUNTER SYMPTOMS
COUGH: 1
COLOR CHANGE: 0
SINUS PRESSURE: 0
RHINORRHEA: 1
SHORTNESS OF BREATH: 0
TROUBLE SWALLOWING: 0
SORE THROAT: 1
VOICE CHANGE: 0

## 2018-09-16 ASSESSMENT — PAIN SCALES - GENERAL: PAINLEVEL_OUTOF10: 5

## 2018-09-16 ASSESSMENT — PAIN DESCRIPTION - LOCATION: LOCATION: THROAT

## 2018-09-25 ENCOUNTER — OFFICE VISIT (OUTPATIENT)
Dept: FAMILY MEDICINE CLINIC | Age: 63
End: 2018-09-25
Payer: COMMERCIAL

## 2018-09-25 VITALS
SYSTOLIC BLOOD PRESSURE: 135 MMHG | OXYGEN SATURATION: 96 % | DIASTOLIC BLOOD PRESSURE: 80 MMHG | HEART RATE: 84 BPM | HEIGHT: 61 IN | TEMPERATURE: 98 F | RESPIRATION RATE: 20 BRPM | BODY MASS INDEX: 25.49 KG/M2 | WEIGHT: 135 LBS

## 2018-09-25 DIAGNOSIS — H92.01 OTALGIA OF RIGHT EAR: Primary | ICD-10-CM

## 2018-09-25 DIAGNOSIS — R05.9 COUGH: ICD-10-CM

## 2018-09-25 DIAGNOSIS — R09.81 NASAL SINUS CONGESTION: ICD-10-CM

## 2018-09-25 DIAGNOSIS — Z00.00 HEALTHCARE MAINTENANCE: ICD-10-CM

## 2018-09-25 PROCEDURE — G8417 CALC BMI ABV UP PARAM F/U: HCPCS | Performed by: PHYSICIAN ASSISTANT

## 2018-09-25 PROCEDURE — 3017F COLORECTAL CA SCREEN DOC REV: CPT | Performed by: PHYSICIAN ASSISTANT

## 2018-09-25 PROCEDURE — 1036F TOBACCO NON-USER: CPT | Performed by: PHYSICIAN ASSISTANT

## 2018-09-25 PROCEDURE — G8427 DOCREV CUR MEDS BY ELIG CLIN: HCPCS | Performed by: PHYSICIAN ASSISTANT

## 2018-09-25 PROCEDURE — 99213 OFFICE O/P EST LOW 20 MIN: CPT | Performed by: PHYSICIAN ASSISTANT

## 2018-09-25 RX ORDER — PROMETHAZINE HYDROCHLORIDE 6.25 MG/5ML
6.25 SYRUP ORAL 4 TIMES DAILY PRN
Qty: 140 ML | Refills: 0 | Status: SHIPPED | OUTPATIENT
Start: 2018-09-25 | End: 2018-10-02

## 2018-09-25 RX ORDER — NEOMYCIN SULFATE, POLYMYXIN B SULFATE, HYDROCORTISONE 3.5; 10000; 1 MG/ML; [USP'U]/ML; MG/ML
2 SOLUTION/ DROPS AURICULAR (OTIC) 2 TIMES DAILY
Qty: 1 BOTTLE | Refills: 0 | Status: SHIPPED | OUTPATIENT
Start: 2018-09-25 | End: 2018-10-02

## 2018-09-25 RX ORDER — BENZONATATE 200 MG/1
200 CAPSULE ORAL 3 TIMES DAILY PRN
Qty: 30 CAPSULE | Refills: 0 | Status: SHIPPED | OUTPATIENT
Start: 2018-09-25 | End: 2018-10-05

## 2018-09-25 RX ORDER — IBUPROFEN 800 MG/1
800 TABLET ORAL EVERY 8 HOURS PRN
Qty: 30 TABLET | Refills: 0 | Status: SHIPPED | OUTPATIENT
Start: 2018-09-25 | End: 2019-03-08 | Stop reason: ALTCHOICE

## 2018-09-25 ASSESSMENT — PATIENT HEALTH QUESTIONNAIRE - PHQ9
SUM OF ALL RESPONSES TO PHQ9 QUESTIONS 1 & 2: 0
SUM OF ALL RESPONSES TO PHQ QUESTIONS 1-9: 0
2. FEELING DOWN, DEPRESSED OR HOPELESS: 0
SUM OF ALL RESPONSES TO PHQ QUESTIONS 1-9: 0
1. LITTLE INTEREST OR PLEASURE IN DOING THINGS: 0

## 2018-09-25 ASSESSMENT — ENCOUNTER SYMPTOMS
RHINORRHEA: 1
COUGH: 1
SORE THROAT: 1

## 2018-09-25 NOTE — PROGRESS NOTES
Pressure Mother     Migraines Mother     Alzheimer's Disease Father         REVIEW OF SYSTEMS:  Review of Systems   Constitutional: Positive for chills. Negative for fever. HENT: Positive for ear pain, rhinorrhea and sore throat. Negative for congestion. Respiratory: Positive for cough. Negative for shortness of breath. Cardiovascular: Negative for chest pain. Gastrointestinal: Negative for constipation, diarrhea, nausea and vomiting. Genitourinary: Negative for dysuria, frequency and urgency. Musculoskeletal: Negative for back pain, joint pain, myalgias and neck pain. Neurological: Negative for headaches. PHYSICAL EXAM:  Vitals:    09/25/18 1629   BP: 135/80   Site: Left Upper Arm   Position: Sitting   Cuff Size: Medium Adult   Pulse: 84   Resp: 20   Temp: 98 °F (36.7 °C)   TempSrc: Oral   SpO2: 96%   Weight: 135 lb (61.2 kg)   Height: 5' 1\" (1.549 m)     BP Readings from Last 3 Encounters:   09/25/18 135/80   09/16/18 (!) 153/61   08/29/18 126/70        Physical Exam   Constitutional: She is oriented to person, place, and time and well-developed, well-nourished, and in no distress. HENT:   Head: Normocephalic and atraumatic. Right Ear: Hearing, tympanic membrane, external ear and ear canal normal. No drainage or tenderness. No middle ear effusion. Left Ear: Hearing, tympanic membrane, external ear and ear canal normal. No drainage or tenderness. No middle ear effusion. Mouth/Throat: Uvula is midline, oropharynx is clear and moist and mucous membranes are normal.   Eyes: Pupils are equal, round, and reactive to light. Cardiovascular: Normal rate, regular rhythm and normal heart sounds. Pulmonary/Chest: Effort normal and breath sounds normal.   Abdominal: Soft. She exhibits no mass. There is no tenderness. Musculoskeletal: She exhibits no tenderness. Neurological: She is alert and oriented to person, place, and time. Gait normal.   Skin: Skin is warm and dry.    Vitals reviewed. DIAGNOSTIC FINDINGS:  CBC:No results found for: WBC, HGB, PLT    BMP:  No results found for: NA, K, CL, CO2, BUN, CREATININE, GLUCOSE    HEMOGLOBIN A1C: No results found for: LABA1C    FASTING LIPID PANEL:No results found for: CHOL, HDL, TRIG    ASSESSMENT AND PLAN:  Mian Amado was seen today for 6 month follow-up and otalgia. Diagnoses and all orders for this visit:    Otalgia of right ear  -     Lipid Panel; Future  -     ibuprofen (ADVIL;MOTRIN) 800 MG tablet; Take 1 tablet by mouth every 8 hours as needed for Pain or Fever  -     neomycin-polymyxin-hydrocortisone 1 % SOLN otic solution; Place 2 drops into both ears 2 times daily for 7 days  -     CBC Auto Differential; Future    Nasal sinus congestion  -     benzonatate (TESSALON) 200 MG capsule; Take 1 capsule by mouth 3 times daily as needed for Cough    Cough  -     promethazine (PHENERGAN) 6.25 MG/5ML syrup; Take 5 mLs by mouth 4 times daily as needed (cough)    Healthcare maintenance  -     Lipid Panel; Future  -     CBC Auto Differential; Future  -     Comprehensive Metabolic Panel; Future    Other orders  -     Cancel: Hepatitis C Antibody; Future  -     Cancel: HIV Screen; Future  -     Cancel: Hemoglobin A1C; Future      FOLLOW UP AND INSTRUCTIONS:  Return in about 5 months (around 2/25/2019) for Physical, Lab Review. · Discussed use, benefit, and side effects of prescribed medications. Barriers to medication compliance addressed. All patient questions answered. Pt voiced understanding. · Patient given educational materials - see patient instructions    Electronically signed by Pancho Bryant PA-C on 9/25/18 at 4:41 PM    This note is created with the assistance of a speech-recognition program. While intending to generate a document that actually reflects the content of the visit, the document can still have some mistakes which may not have been identified and corrected by editing.

## 2018-09-25 NOTE — PROGRESS NOTES
Visit Information    Have you changed or started any medications since your last visit including any over-the-counter medicines, vitamins, or herbal medicines? no   Have you stopped taking any of your medications? Is so, why? -  no  Are you having any side effects from any of your medications? - no    Have you seen any other physician or provider since your last visit?  no   Have you had any other diagnostic tests since your last visit?  no   Have you been seen in the emergency room and/or had an admission in a hospital since we last saw you?  no   Have you had your routine dental cleaning in the past 6 months?  no     Do you have an active MyChart account? If no, what is the barrier?   Yes    Patient Care Team:  Anni Mckeon PA-C as PCP - General (Physician Assistant)  SOL Bowens CNP as PCP - S Attributed Provider    Medical History Review  Past Medical, Family, and Social History reviewed and does not contribute to the patient presenting condition    Health Maintenance   Topic Date Due    Hepatitis C screen  1955    HIV screen  03/04/1972    DTaP/Tdap/Td vaccine (1 - Tdap) 03/04/1976    Lipid screen  03/04/1997    Diabetes screen  03/04/1997    Colon cancer screen colonoscopy  03/04/2007    Breast cancer screen  03/10/2018    Cervical cancer screen  03/30/2018    Flu vaccine (1) 03/08/2019 (Originally 9/1/2018)    Shingles Vaccine (1 of 2 - 2 Dose Series) 03/08/2019 (Originally 3/4/2007)

## 2018-09-26 ASSESSMENT — ENCOUNTER SYMPTOMS
CONSTIPATION: 0
NAUSEA: 0
DIARRHEA: 0
SHORTNESS OF BREATH: 0
VOMITING: 0
BACK PAIN: 0

## 2018-10-12 ENCOUNTER — HOSPITAL ENCOUNTER (OUTPATIENT)
Age: 63
Discharge: HOME | End: 2018-10-12
Payer: COMMERCIAL

## 2018-10-12 DIAGNOSIS — Z00.00 HEALTHCARE MAINTENANCE: ICD-10-CM

## 2018-10-12 DIAGNOSIS — H92.01 OTALGIA OF RIGHT EAR: ICD-10-CM

## 2018-10-12 LAB
ABSOLUTE EOS #: 0.11 K/UL (ref 0–0.44)
ABSOLUTE IMMATURE GRANULOCYTE: <0.03 K/UL (ref 0–0.3)
ABSOLUTE LYMPH #: 2.4 K/UL (ref 1.1–3.7)
ABSOLUTE MONO #: 0.26 K/UL (ref 0.1–1.2)
ALBUMIN SERPL-MCNC: 4.2 G/DL (ref 3.5–5.2)
ALBUMIN/GLOBULIN RATIO: 1.4 (ref 1–2.5)
ALP BLD-CCNC: 93 U/L (ref 35–104)
ALT SERPL-CCNC: 16 U/L (ref 5–33)
ANION GAP SERPL CALCULATED.3IONS-SCNC: 12 MMOL/L (ref 9–17)
AST SERPL-CCNC: 21 U/L
BASOPHILS # BLD: 1 % (ref 0–2)
BASOPHILS ABSOLUTE: 0.04 K/UL (ref 0–0.2)
BILIRUB SERPL-MCNC: 0.41 MG/DL (ref 0.3–1.2)
BUN BLDV-MCNC: 12 MG/DL (ref 8–23)
BUN/CREAT BLD: ABNORMAL (ref 9–20)
CALCIUM SERPL-MCNC: 9 MG/DL (ref 8.6–10.4)
CHLORIDE BLD-SCNC: 110 MMOL/L (ref 98–107)
CHOLESTEROL/HDL RATIO: 4.3
CHOLESTEROL: 237 MG/DL
CO2: 26 MMOL/L (ref 20–31)
CREAT SERPL-MCNC: 0.62 MG/DL (ref 0.5–0.9)
DIFFERENTIAL TYPE: ABNORMAL
EOSINOPHILS RELATIVE PERCENT: 2 % (ref 1–4)
GFR AFRICAN AMERICAN: >60 ML/MIN
GFR NON-AFRICAN AMERICAN: >60 ML/MIN
GFR SERPL CREATININE-BSD FRML MDRD: ABNORMAL ML/MIN/{1.73_M2}
GFR SERPL CREATININE-BSD FRML MDRD: ABNORMAL ML/MIN/{1.73_M2}
GLUCOSE BLD-MCNC: 88 MG/DL (ref 70–99)
HCT VFR BLD CALC: 38.7 % (ref 36.3–47.1)
HDLC SERPL-MCNC: 55 MG/DL
HEMOGLOBIN: 12.4 G/DL (ref 11.9–15.1)
IMMATURE GRANULOCYTES: 0 %
LDL CHOLESTEROL: 151 MG/DL (ref 0–130)
LYMPHOCYTES # BLD: 42 % (ref 24–43)
MCH RBC QN AUTO: 32.3 PG (ref 25.2–33.5)
MCHC RBC AUTO-ENTMCNC: 32 G/DL (ref 28.4–34.8)
MCV RBC AUTO: 100.8 FL (ref 82.6–102.9)
MONOCYTES # BLD: 5 % (ref 3–12)
NRBC AUTOMATED: 0 PER 100 WBC
PDW BLD-RTO: 12.8 % (ref 11.8–14.4)
PLATELET # BLD: 236 K/UL (ref 138–453)
PLATELET ESTIMATE: ABNORMAL
PMV BLD AUTO: 11.5 FL (ref 8.1–13.5)
POTASSIUM SERPL-SCNC: 4.4 MMOL/L (ref 3.7–5.3)
RBC # BLD: 3.84 M/UL (ref 3.95–5.11)
RBC # BLD: ABNORMAL 10*6/UL
SEG NEUTROPHILS: 50 % (ref 36–65)
SEGMENTED NEUTROPHILS ABSOLUTE COUNT: 2.86 K/UL (ref 1.5–8.1)
SODIUM BLD-SCNC: 148 MMOL/L (ref 135–144)
TOTAL PROTEIN: 7.1 G/DL (ref 6.4–8.3)
TRIGL SERPL-MCNC: 153 MG/DL
VLDLC SERPL CALC-MCNC: ABNORMAL MG/DL (ref 1–30)
WBC # BLD: 5.7 K/UL (ref 3.5–11.3)
WBC # BLD: ABNORMAL 10*3/UL

## 2018-10-12 PROCEDURE — 36415 COLL VENOUS BLD VENIPUNCTURE: CPT

## 2018-10-12 PROCEDURE — 80053 COMPREHEN METABOLIC PANEL: CPT

## 2018-10-12 PROCEDURE — 85025 COMPLETE CBC W/AUTO DIFF WBC: CPT

## 2018-10-12 PROCEDURE — 80061 LIPID PANEL: CPT

## 2018-10-23 ENCOUNTER — OFFICE VISIT (OUTPATIENT)
Dept: FAMILY MEDICINE CLINIC | Age: 63
End: 2018-10-23
Payer: COMMERCIAL

## 2018-10-23 VITALS
SYSTOLIC BLOOD PRESSURE: 137 MMHG | OXYGEN SATURATION: 99 % | RESPIRATION RATE: 20 BRPM | BODY MASS INDEX: 26.24 KG/M2 | TEMPERATURE: 97 F | HEART RATE: 54 BPM | DIASTOLIC BLOOD PRESSURE: 71 MMHG | HEIGHT: 61 IN | WEIGHT: 139 LBS

## 2018-10-23 DIAGNOSIS — R05.8 ALLERGIC COUGH: ICD-10-CM

## 2018-10-23 DIAGNOSIS — H92.01 OTALGIA OF RIGHT EAR: ICD-10-CM

## 2018-10-23 DIAGNOSIS — E66.3 OVERWEIGHT (BMI 25.0-29.9): ICD-10-CM

## 2018-10-23 DIAGNOSIS — K21.9 GASTROESOPHAGEAL REFLUX DISEASE, ESOPHAGITIS PRESENCE NOT SPECIFIED: Primary | ICD-10-CM

## 2018-10-23 PROCEDURE — 99213 OFFICE O/P EST LOW 20 MIN: CPT | Performed by: PHYSICIAN ASSISTANT

## 2018-10-23 PROCEDURE — G8484 FLU IMMUNIZE NO ADMIN: HCPCS | Performed by: PHYSICIAN ASSISTANT

## 2018-10-23 PROCEDURE — G8427 DOCREV CUR MEDS BY ELIG CLIN: HCPCS | Performed by: PHYSICIAN ASSISTANT

## 2018-10-23 PROCEDURE — 3017F COLORECTAL CA SCREEN DOC REV: CPT | Performed by: PHYSICIAN ASSISTANT

## 2018-10-23 PROCEDURE — 1036F TOBACCO NON-USER: CPT | Performed by: PHYSICIAN ASSISTANT

## 2018-10-23 PROCEDURE — G8417 CALC BMI ABV UP PARAM F/U: HCPCS | Performed by: PHYSICIAN ASSISTANT

## 2018-10-23 RX ORDER — RANITIDINE HCL 75 MG
75 TABLET ORAL 2 TIMES DAILY
Qty: 60 TABLET | Refills: 0 | Status: SHIPPED | OUTPATIENT
Start: 2018-10-23 | End: 2018-11-21 | Stop reason: SDUPTHER

## 2018-10-23 RX ORDER — CETIRIZINE HYDROCHLORIDE 10 MG/1
10 TABLET ORAL DAILY
Qty: 30 TABLET | Refills: 3 | Status: SHIPPED | OUTPATIENT
Start: 2018-10-23 | End: 2019-09-24 | Stop reason: SDUPTHER

## 2018-10-23 ASSESSMENT — ENCOUNTER SYMPTOMS: COUGH: 1

## 2018-10-23 NOTE — PROGRESS NOTES
Visit Information    Have you changed or started any medications since your last visit including any over-the-counter medicines, vitamins, or herbal medicines? no   Have you stopped taking any of your medications? Is so, why? -  no  Are you having any side effects from any of your medications? - no    Have you seen any other physician or provider since your last visit?  no  Have you had any other diagnostic tests since your last visit?  no   Have you been seen in the emergency room and/or had an admission in a hospital since we last saw you?  no   Have you had your routine dental cleaning in the past 6 months?  no     Do you have an active MyChart account? If no, what is the barrier?   Yes    Patient Care Team:  Nikolas Ann PA-C as PCP - General (Physician Assistant)  Nikolas Ann PA-C as PCP - S Attributed Provider    Medical History Review  Past Medical, Family, and Social History reviewed and does contribute to the patient presenting condition    Health Maintenance   Topic Date Due    Hepatitis C screen  1955    HIV screen  03/04/1972    Colon cancer screen colonoscopy  03/04/2007    Flu vaccine (1) 03/08/2019 (Originally 9/1/2018)    Shingles Vaccine (1 of 2 - 2 Dose Series) 03/08/2019 (Originally 3/4/2007)    DTaP/Tdap/Td vaccine (1 - Tdap) 09/01/2019 (Originally 3/4/1976)    Breast cancer screen  03/09/2020 (Originally 3/10/2018)    Cervical cancer screen  03/29/2021 (Originally 3/30/2018)    Lipid screen  10/12/2023

## 2018-10-26 PROBLEM — E66.09 NON MORBID OBESITY DUE TO EXCESS CALORIES: Status: RESOLVED | Noted: 2017-03-31 | Resolved: 2018-10-26

## 2018-10-26 ASSESSMENT — ENCOUNTER SYMPTOMS
SHORTNESS OF BREATH: 0
DIARRHEA: 0
CONSTIPATION: 0
VOMITING: 0
BACK PAIN: 0
NAUSEA: 0

## 2018-11-21 ENCOUNTER — TELEPHONE (OUTPATIENT)
Dept: PRIMARY CARE CLINIC | Age: 63
End: 2018-11-21

## 2018-11-21 DIAGNOSIS — K21.9 GASTROESOPHAGEAL REFLUX DISEASE, ESOPHAGITIS PRESENCE NOT SPECIFIED: Primary | ICD-10-CM

## 2018-11-21 RX ORDER — RANITIDINE HCL 75 MG
75 TABLET ORAL 2 TIMES DAILY
Qty: 60 TABLET | Refills: 2 | Status: SHIPPED | OUTPATIENT
Start: 2018-11-21

## 2019-01-23 ENCOUNTER — OFFICE VISIT (OUTPATIENT)
Dept: PRIMARY CARE CLINIC | Age: 64
End: 2019-01-23
Payer: COMMERCIAL

## 2019-01-23 VITALS
HEIGHT: 61 IN | DIASTOLIC BLOOD PRESSURE: 84 MMHG | WEIGHT: 137 LBS | HEART RATE: 65 BPM | SYSTOLIC BLOOD PRESSURE: 134 MMHG | TEMPERATURE: 97.3 F | BODY MASS INDEX: 25.86 KG/M2

## 2019-01-23 DIAGNOSIS — J06.9 ACUTE URI: Primary | ICD-10-CM

## 2019-01-23 PROCEDURE — 3017F COLORECTAL CA SCREEN DOC REV: CPT | Performed by: NURSE PRACTITIONER

## 2019-01-23 PROCEDURE — 1036F TOBACCO NON-USER: CPT | Performed by: NURSE PRACTITIONER

## 2019-01-23 PROCEDURE — G8427 DOCREV CUR MEDS BY ELIG CLIN: HCPCS | Performed by: NURSE PRACTITIONER

## 2019-01-23 PROCEDURE — 99202 OFFICE O/P NEW SF 15 MIN: CPT | Performed by: NURSE PRACTITIONER

## 2019-01-23 PROCEDURE — G8484 FLU IMMUNIZE NO ADMIN: HCPCS | Performed by: NURSE PRACTITIONER

## 2019-01-23 PROCEDURE — G8417 CALC BMI ABV UP PARAM F/U: HCPCS | Performed by: NURSE PRACTITIONER

## 2019-01-23 RX ORDER — DOXYCYCLINE HYCLATE 100 MG
100 TABLET ORAL 2 TIMES DAILY
Qty: 14 TABLET | Refills: 0 | Status: SHIPPED | OUTPATIENT
Start: 2019-01-23 | End: 2019-01-30

## 2019-01-23 RX ORDER — PREDNISONE 20 MG/1
40 TABLET ORAL DAILY
Qty: 10 TABLET | Refills: 0 | Status: SHIPPED | OUTPATIENT
Start: 2019-01-23 | End: 2019-01-28

## 2019-01-23 RX ORDER — BENZONATATE 200 MG/1
200 CAPSULE ORAL 3 TIMES DAILY PRN
Qty: 15 CAPSULE | Refills: 0 | Status: SHIPPED | OUTPATIENT
Start: 2019-01-23 | End: 2019-01-30

## 2019-01-23 ASSESSMENT — ENCOUNTER SYMPTOMS
SHORTNESS OF BREATH: 0
SINUS PRESSURE: 1
SINUS PAIN: 1
SORE THROAT: 0
RHINORRHEA: 1
COUGH: 1

## 2019-03-08 ENCOUNTER — OFFICE VISIT (OUTPATIENT)
Dept: NEUROLOGY | Age: 64
End: 2019-03-08
Payer: COMMERCIAL

## 2019-03-08 VITALS
WEIGHT: 138.8 LBS | DIASTOLIC BLOOD PRESSURE: 80 MMHG | BODY MASS INDEX: 26.21 KG/M2 | HEIGHT: 61 IN | HEART RATE: 60 BPM | SYSTOLIC BLOOD PRESSURE: 139 MMHG

## 2019-03-08 DIAGNOSIS — G44.099 TRIGEMINAL AUTONOMIC CEPHALGIAS: Primary | ICD-10-CM

## 2019-03-08 DIAGNOSIS — G47.33 OSA (OBSTRUCTIVE SLEEP APNEA): ICD-10-CM

## 2019-03-08 PROCEDURE — G8484 FLU IMMUNIZE NO ADMIN: HCPCS | Performed by: PSYCHIATRY & NEUROLOGY

## 2019-03-08 PROCEDURE — 1036F TOBACCO NON-USER: CPT | Performed by: PSYCHIATRY & NEUROLOGY

## 2019-03-08 PROCEDURE — G8427 DOCREV CUR MEDS BY ELIG CLIN: HCPCS | Performed by: PSYCHIATRY & NEUROLOGY

## 2019-03-08 PROCEDURE — G8417 CALC BMI ABV UP PARAM F/U: HCPCS | Performed by: PSYCHIATRY & NEUROLOGY

## 2019-03-08 PROCEDURE — 3017F COLORECTAL CA SCREEN DOC REV: CPT | Performed by: PSYCHIATRY & NEUROLOGY

## 2019-03-08 PROCEDURE — 99214 OFFICE O/P EST MOD 30 MIN: CPT | Performed by: PSYCHIATRY & NEUROLOGY

## 2019-07-25 ENCOUNTER — OFFICE VISIT (OUTPATIENT)
Dept: PRIMARY CARE CLINIC | Age: 64
End: 2019-07-25
Payer: COMMERCIAL

## 2019-07-25 VITALS
OXYGEN SATURATION: 99 % | SYSTOLIC BLOOD PRESSURE: 132 MMHG | HEART RATE: 60 BPM | TEMPERATURE: 97.3 F | DIASTOLIC BLOOD PRESSURE: 71 MMHG | HEIGHT: 61 IN | RESPIRATION RATE: 20 BRPM | WEIGHT: 135 LBS | BODY MASS INDEX: 25.49 KG/M2

## 2019-07-25 DIAGNOSIS — E66.3 OVERWEIGHT (BMI 25.0-29.9): ICD-10-CM

## 2019-07-25 DIAGNOSIS — M54.32 SCIATICA OF LEFT SIDE: Primary | ICD-10-CM

## 2019-07-25 PROCEDURE — 3017F COLORECTAL CA SCREEN DOC REV: CPT | Performed by: PHYSICIAN ASSISTANT

## 2019-07-25 PROCEDURE — 1036F TOBACCO NON-USER: CPT | Performed by: PHYSICIAN ASSISTANT

## 2019-07-25 PROCEDURE — G8427 DOCREV CUR MEDS BY ELIG CLIN: HCPCS | Performed by: PHYSICIAN ASSISTANT

## 2019-07-25 PROCEDURE — 99213 OFFICE O/P EST LOW 20 MIN: CPT | Performed by: PHYSICIAN ASSISTANT

## 2019-07-25 PROCEDURE — G8417 CALC BMI ABV UP PARAM F/U: HCPCS | Performed by: PHYSICIAN ASSISTANT

## 2019-07-25 RX ORDER — GABAPENTIN 100 MG/1
100 CAPSULE ORAL 2 TIMES DAILY
Qty: 60 CAPSULE | Refills: 0 | Status: SHIPPED | OUTPATIENT
Start: 2019-07-25 | End: 2019-09-24

## 2019-07-25 RX ORDER — IBUPROFEN 800 MG/1
800 TABLET ORAL 3 TIMES DAILY PRN
Qty: 90 TABLET | Refills: 0 | Status: SHIPPED | OUTPATIENT
Start: 2019-07-25 | End: 2020-12-14 | Stop reason: SDUPTHER

## 2019-07-25 ASSESSMENT — PATIENT HEALTH QUESTIONNAIRE - PHQ9
SUM OF ALL RESPONSES TO PHQ9 QUESTIONS 1 & 2: 0
2. FEELING DOWN, DEPRESSED OR HOPELESS: 0
1. LITTLE INTEREST OR PLEASURE IN DOING THINGS: 0
SUM OF ALL RESPONSES TO PHQ QUESTIONS 1-9: 0
SUM OF ALL RESPONSES TO PHQ QUESTIONS 1-9: 0

## 2019-07-25 ASSESSMENT — ENCOUNTER SYMPTOMS: COUGH: 1

## 2019-07-25 NOTE — PROGRESS NOTES
Negative for shortness of breath and wheezing. Cardiovascular: Negative for chest pain. Gastrointestinal: Negative for constipation, diarrhea, nausea and vomiting. Genitourinary: Negative for dysuria, frequency and urgency. Musculoskeletal: Positive for back pain and myalgias. Negative for neck pain. Neurological: Positive for tingling and numbness. Negative for headaches. PHYSICAL EXAM:  Vitals:    07/25/19 1540   BP: 132/71   Site: Right Upper Arm   Position: Sitting   Cuff Size: Medium Adult   Pulse: 60   Resp: 20   Temp: 97.3 °F (36.3 °C)   TempSrc: Oral   SpO2: 99%   Weight: 135 lb (61.2 kg)   Height: 5' 1\" (1.549 m)     BP Readings from Last 3 Encounters:   07/30/19 (!) 142/78   07/25/19 132/71   03/08/19 139/80        Physical Exam   Constitutional: She is oriented to person, place, and time. Vital signs are normal. She appears well-developed and well-nourished. She is cooperative. HENT:   Head: Normocephalic and atraumatic. Right Ear: External ear normal.   Left Ear: External ear normal.   Nose: Nose normal.   Eyes: Pupils are equal, round, and reactive to light. Conjunctivae and lids are normal.   Cardiovascular: Normal rate, regular rhythm and normal heart sounds. Pulmonary/Chest: Effort normal and breath sounds normal.   Abdominal: Soft. She exhibits no mass. There is no tenderness. Musculoskeletal: She exhibits no tenderness. Lumbar back: She exhibits no tenderness, no bony tenderness and no pain. Neurological: She is alert and oriented to person, place, and time. Skin: Skin is warm and dry. Vitals reviewed.         DIAGNOSTIC FINDINGS:  CBC:  Lab Results   Component Value Date    WBC 5.7 10/12/2018    HGB 12.4 10/12/2018     10/12/2018       BMP:    Lab Results   Component Value Date     10/12/2018    K 4.4 10/12/2018     10/12/2018    CO2 26 10/12/2018    BUN 12 10/12/2018    CREATININE 0.62 10/12/2018    GLUCOSE 88 10/12/2018       HEMOGLOBIN

## 2019-07-29 ENCOUNTER — TELEPHONE (OUTPATIENT)
Dept: PRIMARY CARE CLINIC | Age: 64
End: 2019-07-29

## 2019-07-29 RX ORDER — TIZANIDINE 4 MG/1
4 TABLET ORAL EVERY 8 HOURS PRN
Qty: 90 TABLET | Refills: 0 | Status: SHIPPED | OUTPATIENT
Start: 2019-07-29 | End: 2019-08-28

## 2019-07-29 NOTE — TELEPHONE ENCOUNTER
Patient called stating the the gabapentin and Motrin is not working and she is suppose to go back to work next Monday but she can barley walk.

## 2019-07-30 ENCOUNTER — HOSPITAL ENCOUNTER (OUTPATIENT)
Age: 64
Discharge: HOME | End: 2019-08-01
Payer: COMMERCIAL

## 2019-07-30 ENCOUNTER — OFFICE VISIT (OUTPATIENT)
Dept: PRIMARY CARE CLINIC | Age: 64
End: 2019-07-30
Payer: COMMERCIAL

## 2019-07-30 ENCOUNTER — HOSPITAL ENCOUNTER (OUTPATIENT)
Dept: GENERAL RADIOLOGY | Age: 64
Discharge: HOME | End: 2019-08-01
Payer: COMMERCIAL

## 2019-07-30 VITALS
OXYGEN SATURATION: 93 % | WEIGHT: 132.2 LBS | BODY MASS INDEX: 25.95 KG/M2 | HEART RATE: 71 BPM | DIASTOLIC BLOOD PRESSURE: 78 MMHG | TEMPERATURE: 97.2 F | SYSTOLIC BLOOD PRESSURE: 142 MMHG | HEIGHT: 60 IN

## 2019-07-30 DIAGNOSIS — E66.3 OVERWEIGHT (BMI 25.0-29.9): ICD-10-CM

## 2019-07-30 DIAGNOSIS — R03.0 ELEVATED BLOOD PRESSURE READING: ICD-10-CM

## 2019-07-30 DIAGNOSIS — M54.32 SCIATICA OF LEFT SIDE: ICD-10-CM

## 2019-07-30 DIAGNOSIS — M25.562 ACUTE PAIN OF LEFT KNEE: ICD-10-CM

## 2019-07-30 DIAGNOSIS — M25.562 ACUTE PAIN OF LEFT KNEE: Primary | ICD-10-CM

## 2019-07-30 PROCEDURE — 73562 X-RAY EXAM OF KNEE 3: CPT

## 2019-07-30 PROCEDURE — G8417 CALC BMI ABV UP PARAM F/U: HCPCS | Performed by: PHYSICIAN ASSISTANT

## 2019-07-30 PROCEDURE — G8427 DOCREV CUR MEDS BY ELIG CLIN: HCPCS | Performed by: PHYSICIAN ASSISTANT

## 2019-07-30 PROCEDURE — 1036F TOBACCO NON-USER: CPT | Performed by: PHYSICIAN ASSISTANT

## 2019-07-30 PROCEDURE — 3017F COLORECTAL CA SCREEN DOC REV: CPT | Performed by: PHYSICIAN ASSISTANT

## 2019-07-30 PROCEDURE — 99214 OFFICE O/P EST MOD 30 MIN: CPT | Performed by: PHYSICIAN ASSISTANT

## 2019-07-30 NOTE — PROGRESS NOTES
shortness of breath and wheezing. Cardiovascular: Negative for chest pain. Gastrointestinal: Negative for constipation, diarrhea, nausea and vomiting. Genitourinary: Negative for dysuria, frequency and urgency. Musculoskeletal: Positive for arthralgias and myalgias. Negative for back pain and neck pain. Neurological: Negative for tingling, numbness and headaches. PHYSICAL EXAM:  Vitals:    07/30/19 1045   BP: (!) 142/78   Pulse: 71   Temp: 97.2 °F (36.2 °C)   SpO2: 93%   Weight: 132 lb 3.2 oz (60 kg)   Height: 5' (1.524 m)     BP Readings from Last 3 Encounters:   07/30/19 (!) 142/78   07/25/19 132/71   03/08/19 139/80        Physical Exam   Constitutional: She is oriented to person, place, and time. She appears well-developed and well-nourished. She is cooperative. HENT:   Head: Normocephalic and atraumatic. Right Ear: External ear normal.   Left Ear: External ear normal.   Nose: Nose normal.   Eyes: Pupils are equal, round, and reactive to light. Conjunctivae and lids are normal.   Cardiovascular: Normal rate, regular rhythm and normal heart sounds. Pulmonary/Chest: Effort normal and breath sounds normal.   Abdominal: Soft. She exhibits no mass. There is no tenderness. Musculoskeletal: She exhibits no tenderness. Left knee: She exhibits normal range of motion, no swelling, no effusion and no bony tenderness. No tenderness found. Neurological: She is alert and oriented to person, place, and time. Skin: Skin is warm and dry. Vitals reviewed.         DIAGNOSTIC FINDINGS:  CBC:  Lab Results   Component Value Date    WBC 5.7 10/12/2018    HGB 12.4 10/12/2018     10/12/2018       BMP:    Lab Results   Component Value Date     10/12/2018    K 4.4 10/12/2018     10/12/2018    CO2 26 10/12/2018    BUN 12 10/12/2018    CREATININE 0.62 10/12/2018    GLUCOSE 88 10/12/2018       HEMOGLOBIN A1C: No results found for: LABA1C    FASTING LIPID PANEL:  Lab Results   Component

## 2019-08-01 ENCOUNTER — TELEPHONE (OUTPATIENT)
Dept: PRIMARY CARE CLINIC | Age: 64
End: 2019-08-01

## 2019-08-06 ASSESSMENT — ENCOUNTER SYMPTOMS
DIARRHEA: 0
BACK PAIN: 1
NAUSEA: 0
WHEEZING: 0
VOMITING: 0
CONSTIPATION: 0
SHORTNESS OF BREATH: 0

## 2019-08-09 ENCOUNTER — HOSPITAL ENCOUNTER (OUTPATIENT)
Dept: PHYSICAL THERAPY | Facility: CLINIC | Age: 64
Setting detail: THERAPIES SERIES
Discharge: HOME | End: 2019-08-09
Payer: COMMERCIAL

## 2019-08-09 PROCEDURE — 97162 PT EVAL MOD COMPLEX 30 MIN: CPT

## 2019-08-09 PROCEDURE — 97110 THERAPEUTIC EXERCISES: CPT

## 2019-08-09 NOTE — CONSULTS
not relieved with change in body position -     Upper Cervical Ligamentous Instabilities Normal (-)/Abnormal (+)   Occipital headache and numbness -   Severe limitation during neck ROM in all directions -   Signs of cervical myelopathy -     Vertebral Artery Insufficiencies- VBI Normal (-)/Abnormal (+)   Drop attack -   Dizziness/light headedness related to neck movement -   Dysphasia -   Dysarthria -   Diplopia -   Positive cranial nerve signs -     Objective:   STRENGTH      Left Right     L1-2 Hip Flex 4+ 4+     Hip Abd 4+ 4+     L3-4 Knee Ext 4+ 4+      L4 Ankle DF 4+ 4+      L5 EHL 4+ 4+     S1 Plant. Flex 4+ 4+ Lumbar    Abdominals FAIR FAIR Flexion 75%   Erector Spinae Not tested d/t aversion to lumbar ext \" \" Extension 25%; immediate inc in L lateral knee pain- traveling into anterior thigh    HS 4+ 4+ Rotation L WNL R WNL      Sidebend L WNL R WNL      UE/LE                                               Unable to continue with lumbar spine AROM testing d/t sig inc L lateral knee, anterior thigh pain. Instructed to sit secondary to inc pain with L LE WB- instructed x2, before pt actually sits. Attempted seated divers for repeated lumbar flexion- reports min relief- completed 3x5. Able to resume lumbar spine AROM testing and remainder of objective tests and measures post seated divers. FAIR T.A. Activation via PT palpation. TESTS (+/-) LEFT RIGHT Not Tested   SLR [] sit [] supine   []   Hamstring (SLR)   []   SKTC - - []   DKTC - - []   Slump/Dural   [x]   SI JT   [x]   MELIDA   [x]   Joint Mobility   [x]   Precious Tests ? Pain ?  Pain No Change Not Tested   RFIS [] [x] [] []   HERB [x] [] [] []   RFIL [] [x] [] []   REIL [] [] [] [x]     OBSERVATION No Deficit Deficit Not Tested Comments   Posture       Forward Head [] [x] []    Rounded Shoulders [] [x] []    Kyphosis [x] [] []    Lordosis [x] [] []    Lateral Shift [x] [] []    Scoliosis [x] [] []    Iliac Crest [] [] [x]    PSIS [] [] [x] pain during active episode of radicular symptoms in order to improve QOL. 2. ? ROM: Patient will demo lumbar spine extension AROM with < 6/10 radicular symptoms in order to improve functional mobility. 3. ? Strength: Patient will demo good, consistent T.A. Activation, via PT palpation, in supine hook-lying but also progressed interventions/positions in order to provide support for lumbar spine. 4. ? Function: Patient will report centralization, decreased intensity, or decreased frequency of L LE radicular symptoms in order to indicate decreased inflammation and improve ADLs and QOL. 5. Independent with Home Exercise Programs  LTG: (to be met in 12 treatments)  1. Patient will report < 5 episodes of L LE radicular symptoms during an 8-hour period, in order to indicate readiness for return to work. 2. Improve LEFS to 50/80    Patient goals: \"be pain-free. \"     Rehab Potential:  [] Good  [x] Fair  [] Poor   Suggested Professional Referral:  [x] No  [] Yes:  Barriers to Goal Achievement:  [] No  [x] Yes: H/o potential lumbar spine pain/pathology d/t previous chiro treatments; no pattern of L LE radicular symptoms; poor pain management. Domestic Concerns:  [x] No  [] Yes:    Pt. Education:  [x] Plans/Goals, Risks/Benefits discussed  [x] Home exercise program  Method of Education: [x] Verbal  [x] Demo  [x] Written  Comprehension of Education:  [x] Verbalizes understanding. [] Demonstrates understanding. [x] Needs Review. [] Demonstrates/verbalizes understanding of HEP/Ed previously given.     Treatment Plan:  [x] Therapeutic Exercise   62210  [] Iontophoresis: 4 mg/mL Dexamethasone Sodium Phosphate  mAmin  06355   [x] Therapeutic Activity  88233 [] Vasopneumatic cold with compression  23853    [] Gait Training   21204 [] Ultrasound   32884   [x] Neuromuscular Re-education  93442 [x] Electrical Stimulation Unattended  29324   [x] Manual Therapy  02146 [] Electrical Stimulation Attended  68283   [x] Instruction in HEP  [] Dry Needling   [] Aquatic Therapy   [id number] [x] Cold/hotpack    [] Massage   [id number]      [x] Lumbar/Cervical Traction  [id number]     []  Medication allergies reviewed for use of    Dexamethasone Sodium Phosphate 4mg/ml     with iontophoresis treatments. Pt is not allergic. Frequency:  2 x/week for 12 visits    Todays Treatment:  Modalities: HP, CP PRN  Precautions: potential L lumbar IV disc herniation- bolivar-lateral; symptoms L lateral knee joint into thigh and calf; extension distalizes symptoms, flexion centralizes symptoms at eval  Exercises: no pattern to L LE symptoms- are severe- unable to bear weight through L LE when occur  Exercise Reps/ Time Weight/ Level Comments   Seated lumbar flexion/\"divers\" X10, 5\"  HEP   DKTC- repeated motion X10, 5\"  HEP   SKTC str. X30\" ea  HEP    Piriformis str. X30\" ea  DO NEXT VIST   Supine T.A. Static holds X5, 5\"  Count out loud; HEP                                 Other:    Specific Instructions for next treatment: Review all previously issued HEP interventions. Focus upon core strengthening and stretching interventions with bias for lumbar flexion. Avoid lumbar extension at this time. Follow-up with pt regarding possible receiving of lumbar spine MRI results next visit.      Evaluation Complexity:  History (Personal factors, comorbidities) [] 0 [x] 1-2 [] 3+   Exam (limitations, restrictions) [] 1-2 [x] 3 [] 4+   Clinical presentation (progression) [] Stable [x] Evolving  [] Unstable   Decision Making [] Low [x] Moderate [] High    [] Low Complexity [x] Moderate Complexity [] High Complexity     Treatment Charges: Mins Units   [x] Evaluation       []  Low       [x]  Moderate       []  High 28 1   []  Modalities     [x]  Ther Exercise 14 1   []  Manual Therapy     []  Ther Activities     []  Aquatics     []  Vasocompression     []  Other 42 2     TOTAL TREATMENT TIME: 42    Time in: 11:03AM      Time out: 11:55AM    Electronically signed by: Harsh Caal

## 2019-08-12 ASSESSMENT — ENCOUNTER SYMPTOMS
CONSTIPATION: 0
COUGH: 0
WHEEZING: 0
BACK PAIN: 0
DIARRHEA: 0
NAUSEA: 0
SHORTNESS OF BREATH: 0
VOMITING: 0

## 2019-08-16 ENCOUNTER — HOSPITAL ENCOUNTER (OUTPATIENT)
Dept: PHYSICAL THERAPY | Facility: CLINIC | Age: 64
Setting detail: THERAPIES SERIES
Discharge: HOME | End: 2019-08-16
Payer: COMMERCIAL

## 2019-08-16 PROCEDURE — 97110 THERAPEUTIC EXERCISES: CPT

## 2019-08-16 NOTE — FLOWSHEET NOTE
decreased frequency of L LE radicular symptoms in order to indicate decreased inflammation and improve ADLs and QOL. 5. Independent with Home Exercise Programs    LTG: (to be met in 12 treatments)  1. Patient will report < 5 episodes of L LE radicular symptoms during an 8-hour period, in order to indicate readiness for return to work. 2. Improve LEFS to 50/80     Patient goals: \"be pain-free. \"    Pt. Education:  [x] Yes  [] No  [x] Reviewed Prior HEP/Ed  Method of Education: [x] Verbal  [x] Demo  [] Written  Comprehension of Education:  [x] Verbalizes understanding. [] Demonstrates understanding. [x] Needs review. [] Demonstrates/verbalizes HEP/Ed previously given. Plan: [x] Continue per plan of care.    [] Other:   Frequency:  2 x/week for 12 visits      Time In: 5560             Time Out: 7149    Electronically signed by:  Zeynep Burks PTA

## 2019-08-21 ENCOUNTER — HOSPITAL ENCOUNTER (OUTPATIENT)
Dept: PHYSICAL THERAPY | Facility: CLINIC | Age: 64
Setting detail: THERAPIES SERIES
Discharge: HOME | End: 2019-08-21
Payer: COMMERCIAL

## 2019-08-21 PROCEDURE — 97110 THERAPEUTIC EXERCISES: CPT

## 2019-08-21 NOTE — FLOWSHEET NOTE
[x] [address] [facility]  Outpatient Rehabilitation &  Therapy  [facility] [address]   [address]  P: [phone]  F: [phone]     Physical Therapy Daily Treatment Note    Date:  2019  Patient Name:  Kelly Diamond \"Zhang\"   :  1955    MRN: [id number]  Physician: Dr. Orlando Snyder, [address]: [place] (30 visits per fernandez year)  Medical Diagnosis:  M25.562- acute pain of left knee; M54.32- sciatica of left side                        Rehab Codes: M25.562; M54.32; M51.06; M62.81;  S83.242  Onset Date: 2019              Next 's appt.: 2019     Visit# / total visits: 3/12  Cancels/No Shows: 0/0    Subjective:    Pain:  [x] Yes  [] No Location: L lateral LE- knee, calf, ant shin; low back- not at present   Pain Rating: (0-10 scale) 0/10  Pain altered Tx:  [x] No  [] Yes  Action: N/A  Comments: Denies symptoms at present, however, reports continued L lateral knee, calf, ant shin symptoms- specifically with drive to PT appointment today. Reports continued chiro treatments at x1 per week- receiving manipulations with relief. Reports no definitive inc or dec of L LE symptoms with performing repeated flexion interventions while at home. Reports performing Keshia Raker couple of times a day. \" Reports continued symptoms into L LE daily since first treatment session.      Objective:  Modalities:   Precautions: potential L lumbar IV disc herniation- bolivar-lateral; symptoms L lateral knee joint into thigh and calf; extension distalizes symptoms, flexion centralizes symptoms at eval  Exercises: no pattern to L LE symptoms- are severe- unable to bear weight through L LE when they occur  Exercise Reps/ Time Weight/ Level Comments   Mat Ex's      Seated lumbar flexion/\"divers\" 2 sets -10x5\" ea  Re-assessed- 2019- no inc in L LE symptoms during intervention or immediately post   SKTC 2x30\" raghav  B LEs; Reviewed    DKTC 2x30\" raghav  Reviewed     Piriformis Stretch- IR/ER 1x30\" raghav

## 2019-08-23 ENCOUNTER — HOSPITAL ENCOUNTER (OUTPATIENT)
Dept: PHYSICAL THERAPY | Facility: CLINIC | Age: 64
Setting detail: THERAPIES SERIES
Discharge: HOME | End: 2019-08-23
Payer: COMMERCIAL

## 2019-08-23 PROCEDURE — 97140 MANUAL THERAPY 1/> REGIONS: CPT

## 2019-08-23 PROCEDURE — 97110 THERAPEUTIC EXERCISES: CPT

## 2019-08-26 ENCOUNTER — HOSPITAL ENCOUNTER (OUTPATIENT)
Dept: PHYSICAL THERAPY | Facility: CLINIC | Age: 64
Setting detail: THERAPIES SERIES
Discharge: HOME | End: 2019-08-26
Payer: COMMERCIAL

## 2019-08-26 PROCEDURE — 97140 MANUAL THERAPY 1/> REGIONS: CPT

## 2019-08-26 PROCEDURE — 97110 THERAPEUTIC EXERCISES: CPT

## 2019-08-29 ENCOUNTER — HOSPITAL ENCOUNTER (OUTPATIENT)
Dept: PHYSICAL THERAPY | Facility: CLINIC | Age: 64
Setting detail: THERAPIES SERIES
Discharge: HOME | End: 2019-08-29
Payer: COMMERCIAL

## 2019-08-29 PROCEDURE — 97140 MANUAL THERAPY 1/> REGIONS: CPT

## 2019-08-29 PROCEDURE — 97110 THERAPEUTIC EXERCISES: CPT

## 2019-08-29 NOTE — FLOWSHEET NOTE
manual performed. Pt then completed self stretching of gastroc, hamstrings and nn glides. Chiroractor: Dr Selma Doe [phone]    Specific Instructions for next treatment: Discontinue flexion and extension biased interventions. Continue MET, heel lift, and neutral lumbar spine core and hip strengthening. Treatment Charges: Mins Units   []  Modalities     [x]  Ther Exercise 15 1   [x]  Manual Therapy 23 2   []  Ther Activities     []  Aquatics     []  Vasocompression     []  Other     Total Treatment time 38 3     Assessment: [x] Progressing toward goals. Pt accomplished activities as listed above in [address] completed section. Pain dissipates with nerve glide and stretching. Pain occurs more when pt has been sitting. [] No change. [x] Other:     STG: (to be met in 6 treatments)  1. ? Pain: Patient will report < 6/10 pain during active episode of radicular symptoms in order to improve QOL. 2. ? ROM: Patient will demo lumbar spine extension AROM with < 6/10 radicular symptoms in order to improve functional mobility. 3. ? Strength: Patient will demo good, consistent T.A. Activation, via PT palpation, in supine hook-lying but also progressed interventions/positions in order to provide support for lumbar spine. 4. ? Function: Patient will report centralization, decreased intensity, or decreased frequency of L LE radicular symptoms in order to indicate decreased inflammation and improve ADLs and QOL. 5. Independent with Home Exercise Programs    LTG: (to be met in 12 treatments)  1. Patient will report < 5 episodes of L LE radicular symptoms during an 8-hour period, in order to indicate readiness for return to work. 2. Improve LEFS to 50/80     Patient goals: \"be pain-free. \"    Pt. Education:  [x] Yes  [] No  [] Reviewed Prior HEP/Ed  Method of Education: [x] Verbal  [] Demo  [] Written  Comprehension of Education:  [x] Verbalizes understanding. [] Demonstrates understanding.   [] Needs

## 2019-09-03 ENCOUNTER — HOSPITAL ENCOUNTER (OUTPATIENT)
Dept: PHYSICAL THERAPY | Facility: CLINIC | Age: 64
Setting detail: THERAPIES SERIES
Discharge: HOME | End: 2019-09-03
Payer: COMMERCIAL

## 2019-09-03 PROCEDURE — 97140 MANUAL THERAPY 1/> REGIONS: CPT

## 2019-09-03 PROCEDURE — 97110 THERAPEUTIC EXERCISES: CPT

## 2019-09-05 ENCOUNTER — HOSPITAL ENCOUNTER (OUTPATIENT)
Dept: PHYSICAL THERAPY | Facility: CLINIC | Age: 64
Setting detail: THERAPIES SERIES
Discharge: HOME | End: 2019-09-05
Payer: COMMERCIAL

## 2019-09-05 PROCEDURE — 97110 THERAPEUTIC EXERCISES: CPT

## 2019-09-05 NOTE — FLOWSHEET NOTE
Verbalizes understanding. [] Demonstrates understanding. [] Needs review. [] Demonstrates/verbalizes HEP/Ed previously given. Plan: [x] Continue per plan of care. [x] Other: Discontinue flexion and extension biased interventions. Continue MET, heel lift, and neutral lumbar spine core and hip strengthening.      Frequency:  2 x/week for 12 visits      Time In: 1300           Time Out: 8014    Electronically signed by:  Anthony Joiner PTA

## 2019-09-10 ENCOUNTER — HOSPITAL ENCOUNTER (OUTPATIENT)
Dept: PHYSICAL THERAPY | Facility: CLINIC | Age: 64
Setting detail: THERAPIES SERIES
Discharge: HOME | End: 2019-09-10
Payer: COMMERCIAL

## 2019-09-12 ENCOUNTER — APPOINTMENT (OUTPATIENT)
Dept: PHYSICAL THERAPY | Facility: CLINIC | Age: 64
End: 2019-09-12
Payer: COMMERCIAL

## 2019-09-24 ENCOUNTER — OFFICE VISIT (OUTPATIENT)
Dept: PRIMARY CARE CLINIC | Age: 64
End: 2019-09-24
Payer: COMMERCIAL

## 2019-09-24 VITALS
HEART RATE: 68 BPM | BODY MASS INDEX: 26.21 KG/M2 | WEIGHT: 134.2 LBS | SYSTOLIC BLOOD PRESSURE: 123 MMHG | TEMPERATURE: 97.5 F | DIASTOLIC BLOOD PRESSURE: 69 MMHG

## 2019-09-24 DIAGNOSIS — F51.04 PSYCHOPHYSIOLOGICAL INSOMNIA: Primary | ICD-10-CM

## 2019-09-24 DIAGNOSIS — Z76.0 MEDICATION REFILL: ICD-10-CM

## 2019-09-24 DIAGNOSIS — F41.9 ANXIETY: ICD-10-CM

## 2019-09-24 DIAGNOSIS — Z00.00 ANNUAL PHYSICAL EXAM: ICD-10-CM

## 2019-09-24 DIAGNOSIS — Z00.00 HEALTHCARE MAINTENANCE: ICD-10-CM

## 2019-09-24 DIAGNOSIS — E66.3 OVERWEIGHT (BMI 25.0-29.9): ICD-10-CM

## 2019-09-24 PROCEDURE — 99396 PREV VISIT EST AGE 40-64: CPT | Performed by: PHYSICIAN ASSISTANT

## 2019-09-24 RX ORDER — FLUTICASONE PROPIONATE 50 MCG
1 SPRAY, SUSPENSION (ML) NASAL DAILY
Qty: 1 BOTTLE | Refills: 2 | Status: SHIPPED | OUTPATIENT
Start: 2019-09-24 | End: 2020-04-16

## 2019-09-24 RX ORDER — TRAZODONE HYDROCHLORIDE 50 MG/1
25 TABLET ORAL NIGHTLY PRN
Qty: 30 TABLET | Refills: 0 | Status: SHIPPED | OUTPATIENT
Start: 2019-09-24 | End: 2019-10-30 | Stop reason: SDUPTHER

## 2019-09-24 RX ORDER — CETIRIZINE HYDROCHLORIDE 10 MG/1
10 TABLET ORAL DAILY
Qty: 30 TABLET | Refills: 5 | Status: SHIPPED | OUTPATIENT
Start: 2019-09-24 | End: 2020-04-16

## 2019-09-24 ASSESSMENT — ENCOUNTER SYMPTOMS: BACK PAIN: 1

## 2019-10-06 ASSESSMENT — ENCOUNTER SYMPTOMS
VOMITING: 0
DIARRHEA: 0
SHORTNESS OF BREATH: 0
ABDOMINAL PAIN: 0
CONSTIPATION: 0
RHINORRHEA: 0
EYE PAIN: 0
NAUSEA: 0
SORE THROAT: 0
COUGH: 0

## 2019-10-08 ENCOUNTER — HOSPITAL ENCOUNTER (OUTPATIENT)
Dept: PHYSICAL THERAPY | Facility: CLINIC | Age: 64
Setting detail: THERAPIES SERIES
End: 2019-10-08
Payer: COMMERCIAL

## 2019-10-09 ENCOUNTER — HOSPITAL ENCOUNTER (OUTPATIENT)
Dept: PHYSICAL THERAPY | Facility: CLINIC | Age: 64
Setting detail: THERAPIES SERIES
Discharge: HOME | End: 2019-10-09
Payer: COMMERCIAL

## 2019-10-09 PROCEDURE — 97110 THERAPEUTIC EXERCISES: CPT

## 2019-10-11 ENCOUNTER — HOSPITAL ENCOUNTER (OUTPATIENT)
Dept: PHYSICAL THERAPY | Facility: CLINIC | Age: 64
Setting detail: THERAPIES SERIES
Discharge: HOME | End: 2019-10-11
Payer: COMMERCIAL

## 2019-10-18 ENCOUNTER — HOSPITAL ENCOUNTER (OUTPATIENT)
Dept: PHYSICAL THERAPY | Facility: CLINIC | Age: 64
Setting detail: THERAPIES SERIES
Discharge: HOME | End: 2019-10-18
Payer: COMMERCIAL

## 2019-10-18 PROCEDURE — 97110 THERAPEUTIC EXERCISES: CPT

## 2019-10-29 ENCOUNTER — HOSPITAL ENCOUNTER (OUTPATIENT)
Dept: PHYSICAL THERAPY | Facility: CLINIC | Age: 64
Setting detail: THERAPIES SERIES
Discharge: HOME | End: 2019-10-29
Payer: COMMERCIAL

## 2019-10-29 PROCEDURE — 97110 THERAPEUTIC EXERCISES: CPT

## 2019-11-06 ENCOUNTER — HOSPITAL ENCOUNTER (OUTPATIENT)
Dept: PHYSICAL THERAPY | Facility: CLINIC | Age: 64
Setting detail: THERAPIES SERIES
Discharge: HOME | End: 2019-11-06
Payer: COMMERCIAL

## 2019-11-06 PROCEDURE — 97110 THERAPEUTIC EXERCISES: CPT

## 2020-02-25 NOTE — FLOWSHEET NOTE
Note from 11/27/19:  Also could initiate metformin 500 mg once daily in the evenings (if so recommend checking CMP and A1c in 3 months).      [x] [address] [facility]  Outpatient Rehabilitation &  Therapy  [facility] [address]   [address]  P: [phone]  F: [phone]     Physical Therapy Daily Treatment Note    Date:  2019  Patient Name:  Andrew Bryant \"Zhang\"   :  1955    MRN: [id number]  Physician: Dr. Eric Gutierrez, [address]: [place] (30 visits per fernandez year)  Medical Diagnosis:  M25.562- acute pain of left knee; M54.32- sciatica of left side                        Rehab Codes: M25.562; M54.32; M51.06; M62.81;  S83.242  Onset Date: 2019              Next 's appt.: 2019     Visit# / total visits:   Cancels/No Shows: 0/0    Subjective:    Pain:  [x] Yes  [] No Location: L lateral LE- knee, calf, ant shin; low back- not at present   Pain Rating: (0-10 scale) 7/10  Pain altered Tx:  [x] No  [] Yes  Action: N/A  Comments: patient reports increased pain since last Tx. Has been progressively worsening - again, the patient reports flexed position causes increased calf pain. Objective:  Modalities:   Precautions: potential L lumbar IV disc herniation- bolivar-lateral; symptoms L lateral knee joint into thigh and calf; extension distalizes symptoms, flexion centralizes symptoms at eval  Exercises: no pattern to L LE symptoms- are severe- unable to bear weight through L LE when they occur  Exercise Reps/ Time Weight/ Level Comments   Mat Ex's      Seated lumbar flexion/\"divers\" 2 sets -10x5\" ea  Re-assessed- 2019- no inc in L LE symptoms during intervention or immediately post   SKTC 2x30\" ea  B LEs; Reviewed    DKTC 2x30\" ea  Reviewed     Piriformis Stretch- IR/ER 1x30\" ea  B LEs; toward opp shoulder, but also figure 4 str.  With opp hand as OP; SEE BELOW   TrA Isometric 10x5\"  Difficulty achieving this date   Alt Marching w/ TrA Isometric 10x     Bridge 10x3\"  Cues for gluteal activation    Hip Adduction Isometric 20x3\"     SLR w/TrA Isometric 10x     Hooklying Clamshell 2x10 Lime

## 2020-06-08 ENCOUNTER — OFFICE VISIT (OUTPATIENT)
Dept: PRIMARY CARE CLINIC | Age: 65
End: 2020-06-08
Payer: COMMERCIAL

## 2020-06-08 VITALS
BODY MASS INDEX: 28.13 KG/M2 | HEART RATE: 71 BPM | HEIGHT: 61 IN | OXYGEN SATURATION: 97 % | SYSTOLIC BLOOD PRESSURE: 134 MMHG | DIASTOLIC BLOOD PRESSURE: 71 MMHG | TEMPERATURE: 95.4 F | WEIGHT: 149 LBS

## 2020-06-08 PROCEDURE — 99396 PREV VISIT EST AGE 40-64: CPT | Performed by: PHYSICIAN ASSISTANT

## 2020-06-08 RX ORDER — GUAIFENESIN 600 MG/1
600 TABLET, EXTENDED RELEASE ORAL 2 TIMES DAILY
Qty: 20 TABLET | Refills: 0 | Status: SHIPPED | OUTPATIENT
Start: 2020-06-08 | End: 2020-06-18

## 2020-06-08 RX ORDER — DOXYCYCLINE HYCLATE 100 MG
100 TABLET ORAL 2 TIMES DAILY
Qty: 14 TABLET | Refills: 0 | Status: SHIPPED | OUTPATIENT
Start: 2020-06-11 | End: 2020-06-18

## 2020-06-08 SDOH — ECONOMIC STABILITY: FOOD INSECURITY: WITHIN THE PAST 12 MONTHS, THE FOOD YOU BOUGHT JUST DIDN'T LAST AND YOU DIDN'T HAVE MONEY TO GET MORE.: NEVER TRUE

## 2020-06-08 SDOH — ECONOMIC STABILITY: FOOD INSECURITY: WITHIN THE PAST 12 MONTHS, YOU WORRIED THAT YOUR FOOD WOULD RUN OUT BEFORE YOU GOT MONEY TO BUY MORE.: NEVER TRUE

## 2020-06-08 SDOH — ECONOMIC STABILITY: INCOME INSECURITY: HOW HARD IS IT FOR YOU TO PAY FOR THE VERY BASICS LIKE FOOD, HOUSING, MEDICAL CARE, AND HEATING?: NOT HARD AT ALL

## 2020-06-08 ASSESSMENT — ENCOUNTER SYMPTOMS
SORE THROAT: 1
COUGH: 1
RHINORRHEA: 1
SHORTNESS OF BREATH: 1

## 2020-06-08 ASSESSMENT — PATIENT HEALTH QUESTIONNAIRE - PHQ9
1. LITTLE INTEREST OR PLEASURE IN DOING THINGS: 0
SUM OF ALL RESPONSES TO PHQ QUESTIONS 1-9: 0
SUM OF ALL RESPONSES TO PHQ QUESTIONS 1-9: 0
SUM OF ALL RESPONSES TO PHQ9 QUESTIONS 1 & 2: 0
2. FEELING DOWN, DEPRESSED OR HOPELESS: 0

## 2020-06-08 NOTE — PROGRESS NOTES
Visit Information    Have you changed or started any medications since your last visit including any over-the-counter medicines, vitamins, or herbal medicines? no   Are you having any side effects from any of your medications? -  no  Have you stopped taking any of your medications? Is so, why? -  no    Have you seen any other physician or provider since your last visit? No  Have you had any other diagnostic tests since your last visit? No  Have you been seen in the emergency room and/or had an admission to a hospital since we last saw you? No  Have you had your routine dental cleaning in the past 6 months? yes -     Have you activated your [employer] account? If not, what are your barriers?  Yes     Patient Care Team:  Marilee Lawrence PA-C as PCP - General (Physician Assistant)  Marilee Lawrence PA-C as PCP - [facility]    Medical History Review  Past Medical, Family, and Social History reviewed and does not contribute to the patient presenting condition    Health Maintenance   Topic Date Due    Hepatitis C screen  1955    HIV screen  03/04/1972    DTaP/Tdap/Td vaccine (1 - Tdap) 03/04/1976    Shingles Vaccine (1 of 2) 03/04/2007    Colon cancer screen colonoscopy  03/04/2007    Breast cancer screen  03/10/2018    Flu vaccine (Season Ended) 09/24/2020 (Originally 9/1/2020)    Cervical cancer screen  03/29/2021 (Originally 3/30/2018)    Lipid screen  10/12/2023    Hepatitis A vaccine  Aged Out    Hepatitis B vaccine  Aged Out    Hib vaccine  Aged Out    Meningococcal (ACWY) vaccine  Aged Out    Pneumococcal 0-64 years Vaccine  Aged Out

## 2020-06-08 NOTE — PROGRESS NOTES
patient declined colonoscopy but will complete stool test, Cologuard ordered. Medications reviewed, prescribed Doxycycline 100 mg twice daily for 7 days, Mucinex 600 mg twice daily for 10 days. HPI    Patient Active Problem List   Diagnosis    Tracheobronchitis    Allergic rhinitis    Rhinitis, chronic    Eustachian tube disorder    Headache around the eyes    Nasal sinus congestion    Laryngotracheitis    Sleep disorder    Otalgia of right ear    Anxiety    Stress       Health Maintenance Due   Topic Date Due    DTaP/Tdap/Td vaccine (1 - Tdap) 03/04/1976    Shingles Vaccine (1 of 2) 03/04/2007    Colon cancer screen colonoscopy  03/04/2007    Breast cancer screen  03/10/2018       Allergies   Allergen Reactions    Iodides      Other reaction(s): throat swelling, chest tightness    Keflex [Cephalexin] Other (See Comments)     Severe Back Pain         Current Outpatient Medications   Medication Sig Dispense Refill    guaiFENesin (MUCINEX) 600 MG extended release tablet Take 1 tablet by mouth 2 times daily for 10 days 20 tablet 0    doxycycline hyclate (VIBRA-TABS) 100 MG tablet Take 1 tablet by mouth 2 times daily for 7 days 14 tablet 0    cetirizine (ZYRTEC) 10 MG tablet TAKE 1 TABLET BY MOUTH DAILY 30 tablet 2    fluticasone (FLONASE) 50 MCG/ACT nasal spray USE ONE SPRAY INTO EACH NOSTRIL ONCE DAILY 16 g 2    traZODone (DESYREL) 50 MG tablet TAKE ONE-HALF TABLETS BY MOUTH NIGHTLY AS NEEDED FOR SLEEP 30 tablet 2    ibuprofen (ADVIL;MOTRIN) 800 MG tablet Take 1 tablet by mouth 3 times daily as needed for Pain 90 tablet 0    Omega-3 Fatty Acids (OMEGA 3 PO) Take by mouth daily      CALCIUM PO Take by mouth daily      MULTIPLE VITAMIN PO Take by mouth daily      ranitidine (ZANTAC) 75 MG tablet Take 1 tablet by mouth 2 times daily (Patient not taking: Reported on 6/8/2020) 60 tablet 2     No current facility-administered medications for this visit.         Social History     Tobacco Use    Smoking status: Never Smoker    Smokeless tobacco: Never Used   Substance Use Topics    Alcohol use: No    Drug use: No       Family History   Problem Relation Age of Onset    Heart Disease Mother     Diabetes Mother     Stroke Mother     High Blood Pressure Mother    Andrzej Harkins Migraines Mother     Alzheimer's Disease Father         REVIEW OFSYSTEMS:  Review of Systems   Constitutional: Negative for chills and fever. HENT: Positive for congestion (nasla and chest), rhinorrhea and sore throat. Negative for ear discharge, ear pain, hearing loss and tinnitus. Eyes: Negative for pain and visual disturbance. Respiratory: Positive for cough (productive, yellow) and shortness of breath (d/t ATA). Cardiovascular: Negative for chest pain. Gastrointestinal: Negative for abdominal pain, constipation, diarrhea, nausea and vomiting. Genitourinary: Negative for difficulty urinating, dysuria, frequency and urgency. Musculoskeletal: Negative for arthralgias, back pain and myalgias. Neurological: Negative for dizziness and headaches. PHYSICAL EXAM:  Vitals:    06/08/20 1009   BP: 134/71   Site: Left Upper Arm   Position: Sitting   Cuff Size: Medium Adult   Pulse: 71   Temp: 95.4 °F (35.2 °C)   TempSrc: Temporal   SpO2: 97%   Weight: 149 lb (67.6 kg)   Height: 5' 1\" (1.549 m)     BP Readings from Last 3 Encounters:   06/08/20 134/71   09/24/19 123/69   07/30/19 (!) 142/78        Physical Exam  Vitals signs reviewed. Constitutional:       Appearance: Normal appearance. She is well-developed, well-groomed and overweight. HENT:      Head: Normocephalic and atraumatic. Right Ear: Hearing, ear canal and external ear normal. No tenderness. A middle ear effusion is present. There is no impacted cerumen. Left Ear: Hearing, tympanic membrane, ear canal and external ear normal. No tenderness. No middle ear effusion. There is no impacted cerumen.    Eyes:      Conjunctiva/sclera: Conjunctivae

## 2020-06-13 ENCOUNTER — HOSPITAL ENCOUNTER (OUTPATIENT)
Age: 65
Discharge: HOME | End: 2020-06-13
Payer: COMMERCIAL

## 2020-06-13 LAB
ABSOLUTE EOS #: 0.12 K/UL (ref 0–0.44)
ABSOLUTE IMMATURE GRANULOCYTE: <0.03 K/UL (ref 0–0.3)
ABSOLUTE LYMPH #: 1.96 K/UL (ref 1.1–3.7)
ABSOLUTE MONO #: 0.19 K/UL (ref 0.1–1.2)
ALBUMIN SERPL-MCNC: 4.3 G/DL (ref 3.5–5.2)
ALBUMIN/GLOBULIN RATIO: 1.5 (ref 1–2.5)
ALP BLD-CCNC: 84 U/L (ref 35–104)
ALT SERPL-CCNC: 10 U/L (ref 5–33)
ANION GAP SERPL CALCULATED.3IONS-SCNC: 16 MMOL/L (ref 9–17)
AST SERPL-CCNC: 17 U/L
BASOPHILS # BLD: 1 % (ref 0–2)
BASOPHILS ABSOLUTE: 0.03 K/UL (ref 0–0.2)
BILIRUB SERPL-MCNC: 0.4 MG/DL (ref 0.3–1.2)
BUN BLDV-MCNC: 16 MG/DL (ref 8–23)
BUN/CREAT BLD: ABNORMAL (ref 9–20)
CALCIUM SERPL-MCNC: 8.8 MG/DL (ref 8.6–10.4)
CHLORIDE BLD-SCNC: 108 MMOL/L (ref 98–107)
CHOLESTEROL/HDL RATIO: 3.7
CHOLESTEROL: 198 MG/DL
CO2: 22 MMOL/L (ref 20–31)
CREAT SERPL-MCNC: 0.69 MG/DL (ref 0.5–0.9)
DIFFERENTIAL TYPE: NORMAL
EOSINOPHILS RELATIVE PERCENT: 3 % (ref 1–4)
GFR AFRICAN AMERICAN: >60 ML/MIN
GFR NON-AFRICAN AMERICAN: >60 ML/MIN
GFR SERPL CREATININE-BSD FRML MDRD: ABNORMAL ML/MIN/{1.73_M2}
GFR SERPL CREATININE-BSD FRML MDRD: ABNORMAL ML/MIN/{1.73_M2}
GLUCOSE BLD-MCNC: 85 MG/DL (ref 70–99)
HCT VFR BLD CALC: 40.6 % (ref 36.3–47.1)
HDLC SERPL-MCNC: 53 MG/DL
HEMOGLOBIN: 13.2 G/DL (ref 11.9–15.1)
HEPATITIS C ANTIBODY: NONREACTIVE
IMMATURE GRANULOCYTES: 0 %
LDL CHOLESTEROL: 115 MG/DL (ref 0–130)
LYMPHOCYTES # BLD: 43 % (ref 24–43)
MCH RBC QN AUTO: 32.6 PG (ref 25.2–33.5)
MCHC RBC AUTO-ENTMCNC: 32.5 G/DL (ref 28.4–34.8)
MCV RBC AUTO: 100.2 FL (ref 82.6–102.9)
MONOCYTES # BLD: 4 % (ref 3–12)
NRBC AUTOMATED: 0 PER 100 WBC
PDW BLD-RTO: 12.8 % (ref 11.8–14.4)
PLATELET # BLD: 227 K/UL (ref 138–453)
PLATELET ESTIMATE: NORMAL
PMV BLD AUTO: 11.8 FL (ref 8.1–13.5)
POTASSIUM SERPL-SCNC: 4.2 MMOL/L (ref 3.7–5.3)
RBC # BLD: 4.05 M/UL (ref 3.95–5.11)
RBC # BLD: NORMAL 10*6/UL
SEG NEUTROPHILS: 49 % (ref 36–65)
SEGMENTED NEUTROPHILS ABSOLUTE COUNT: 2.27 K/UL (ref 1.5–8.1)
SODIUM BLD-SCNC: 146 MMOL/L (ref 135–144)
TOTAL PROTEIN: 7.1 G/DL (ref 6.4–8.3)
TRIGL SERPL-MCNC: 152 MG/DL
VLDLC SERPL CALC-MCNC: ABNORMAL MG/DL (ref 1–30)
WBC # BLD: 4.6 K/UL (ref 3.5–11.3)
WBC # BLD: NORMAL 10*3/UL

## 2020-06-13 PROCEDURE — 85025 COMPLETE CBC W/AUTO DIFF WBC: CPT

## 2020-06-13 PROCEDURE — 36415 COLL VENOUS BLD VENIPUNCTURE: CPT

## 2020-06-13 PROCEDURE — 86803 HEPATITIS C AB TEST: CPT

## 2020-06-13 PROCEDURE — 80061 LIPID PANEL: CPT

## 2020-06-13 PROCEDURE — 80053 COMPREHEN METABOLIC PANEL: CPT

## 2020-06-14 ASSESSMENT — ENCOUNTER SYMPTOMS
VOMITING: 0
DIARRHEA: 0
ABDOMINAL PAIN: 0
CONSTIPATION: 0
NAUSEA: 0
BACK PAIN: 0
EYE PAIN: 0

## 2020-07-23 NOTE — PROGRESS NOTES
Problem: Knowledge Deficit  Goal: Patient/family/caregiver demonstrates understanding of disease process, treatment plan, medications, and discharge instructions  Description: INTERVENTIONS:   1. Complete learning assessment and assess knowledge base  2. Provide teaching at level of understanding   3. Provide teaching via preferred learning methods  Outcome: Progressing  Flowsheets (Taken 7/23/2020 0114)  Patient/family/caregiver demonstrates understanding of disease process, treatment plan, medications, and discharge instructions:   Complete learning assessment and assess knowledge base   Provide teaching at level of understanding   Provide teaching via preferred learning methods     Problem: Potential for Compromised Skin Integrity  Goal: Skin Integrity is Maintained or Improved  Description: INTERVENTIONS:  1. Assess and monitor skin integrity  2. Collaborate with interdisciplinary team and initiate plans and interventions as needed  3. Alternate a full bath with partial baths for elderly   4. Monitor patient's hygiene practices   5. Collaborate with wound, ostomy, and continence nurse  Outcome: Progressing  Flowsheets (Taken 7/23/2020 0114)  Skin integrity is maintained or improved:   Assess and monitor skin integrity   Collaborate with interdisciplinary team and initiate plans and interventions as needed   Monitor patient's hygiene practices  Goal: Nutritional status is improving  Description: INTERVENTIONS:  1. Monitor and assess patient for malnutrition (ex- brittle hair, bruises, dry skin, pale skin and conjunctiva, muscle wasting, smooth red tongue, and disorientation)  2. Monitor patient's weight and dietary intake as ordered or per policy  3. Determine patient's food preferences and provide high-protein, high-caloric foods as appropriate  4. Assist patient with eating   5. Allow adequate time for meals   6. Encourage patient to take dietary supplement as ordered   7. Collaborate with dietitian  8.  [facility]  [facility], [address], [address] [place] [address]  Dept: [phone]  Dept Fax: [phone]    MD Clair Samuels MD Ahmed B. Janora Maze, MD Ria Lanius, MD Cherylann Sage, CNP  12/28/2017    HPI:      I had the pleasure of seeing Nuris Castillo, who returns for continuing neurologic care. This patient was previously being followed by Dr. Tea Villar for her right-sided trigeminal autonomic cephalgia, and is currently transitioning her care to me [facility]. She was last seen on July 12, 2017, at which point she was doing well on Periactin 12 mg at bedtime. It was helping both her nasal congestion as well as her headaches. Today, patient reports that her headaches are doing well. She gets them only about once a month, headaches typically last all day. Her typical triggers sleep deprivation, but she has been sleeping well recently. She takes half a Benadryl at night and is able to get 7-8 hours of sleep. She takes 800 mg of ibuprofen as needed for her headaches, only takes one dose. She gets partial relief with this, to the point that she is able to function the rest of the day. Her headaches are described as a dull, achy, constant pain that usually involves the right frontal and temporal areas, associated with tearing on the left eye, no rhinorrhea. The patient also has a history of mild obstructive sleep apnea (AHI 7) diagnosed in September 2016. She was set up on CPAP 8 cm water, but had no improvement of her headaches and was unable to tolerate the mask. Prior treatments:  Nortriptyline 10 mg (help with her sleep but she had to take 6-7 tablets)  Zyrtec and Tylenol PM (overuse)    Neuro imaging:  MRI brain with and without contrast 1/6/2016:   No abnormal enhancing IAC or cerebellopontine angle cistern mass. No evidence for retrocochlear pathology. No evidence for acute or subacute ischemic insult.  No Include patient/family/caregiver in decisions related to nutrition  Outcome: Progressing  Flowsheets (Taken 7/23/2020 0114)  Nutritional status is improving:   Monitor and assess patient for malnutrition (ex- brittle hair, bruises, dry skin, pale skin and conjunctiva, muscle wasting, smooth red tongue, and disorientation)   Monitor patient's weight and dietary intake as ordered or per policy   Determine patient's food preferences and provide high-protein, high-caloric foods as appropriate   Allow adequate time for meals   Include patient/family/caregiver in decisions related to nutrition  Goal: MOBILITY IS MAINTAINED OR IMPROVED  Description: INTERVENTIONS  1. Collaborate with interdisciplinary team and initiate plan and interventions as ordered (PT/OT)  2. Encourage ambulation  3. Up to chair for meals  4. Monitor for signs of deconditioning  Outcome: Progressing  Flowsheets (Taken 7/23/2020 0114)  Mobility is Maintained or Improved:   Encourage ambulation   Monitor for signs of deconditioning     Problem: Pain - Pediatric  Goal: Verbalizes/displays adequate comfort level or baseline comfort level  Description: INTERVENTIONS:  1. Encourage patient to monitor pain and request interventions  2. Assess pain using the appropriate pain scale  3. Administer analgesics based on type and severity of pain and evaluate response  4. Educate/Implement non-pharmacological measures as appropriate and evaluate response  5. Consider cultural, developmental and social influences on pain and pain management  6. Notify Provider if interventions unsuccessful or patient reports new pain  Outcome: Progressing  Flowsheets (Taken 7/23/2020 0114)  Verbalizes/displays adequate comfort level or baseline comfort level:   Encourage patient to monitor pain and request interventions   Assess pain using the appropriate pain scale   Administer analgesics based on type and severity of pain and evaluate response   Educate/Implement  evidence for posterior fossa mass. Few scattered foci of T2/FLAIR hyperintensity noted along the periventricular white matter which are nonspecific in imaging appearance however differential considerations include chronic small vessel disease, inflammatory or demyelinating process or    migraine related changes                                 REVIEW OF SYSTEMS    CONSTITUTIONAL Weight: absent, Appetite: absent, Fatigue: present      HEENT Ears: pain, Visual disturbance: present   RESPIRATORY Shortness of breath: absent, Cough: present   CARDIOVASCULAR Chest pain: absent, Leg swelling :absent      GI Constipation: absent, Diarrhea: absent, Swallowing change: absent       Urinary frequency: absent, Urinary urgency: present, Urinary incontinence: absent   MUSCULOSKELETAL Neck pain: absent, Back pain: absent, Stiffness: absent, Muscle pain: absent, Joint pain: absent Restless legs: absent   DERMATOLOGIC Hair loss: absent, Skin changes: absent   NEUROLOGIC Memory loss: absent, Confusion: absent, Seizures: absent Trouble walking or imbalance: absent, Dizziness: absent, Weakness: absent, Numbness: absent Tremor: absent, Spasm: absent, Speech difficulty: absent, Headache: present, Light sensitivity: absent   PSYCHIATRIC Anxiety: absent, Hallucination: absent, Mood disorder: absent   HEMATOLOGIC Abnormal bleeding: absent, Anemia: absent, Clotting disorder: absent, Lymph gland changes: absent           No Known Allergies        Current Outpatient Prescriptions   Medication Sig Dispense Refill    Omega-3 Fatty Acids (OMEGA 3 PO) Take by mouth daily      CALCIUM PO Take by mouth daily      MULTIPLE VITAMIN PO Take by mouth daily      loratadine (CLARITIN) 10 MG tablet Take 10 mg by mouth daily       No current facility-administered medications for this visit. PHYSICAL EXAMINATION       There were no vitals filed for this visit. Dawood Lopez General Appearance:  Alert, cooperative, no signs of distress, appears stated age   Head:  Normocephalic, no signs of trauma   Eyes:  Conjunctiva/corneas clear;  eyelids intact   Ears:  Normal external ear and canals   Nose: Nares normal, mucosa normal, no drainage    Throat: Lips and tongue normal; teeth normal;  gums normal   Neck: Supple, intact flexion, extension and rotation;   trachea midline;  no adenopathy;   thyroid: not enlarged;   no carotid pulse abnormality   Back:   Symmetric, no curvature, ROM adequate   Lungs:   Respirations unlabored   Heart:  Regular rate and rhythm           Extremities: Extremities normal, no cyanosis, no edema   Pulses: Symmetric over head and neck   Skin: Skin color, texture normal, no rashes, no lesions                                             NEUROLOGIC EXAMINATION    Neurologic Exam     Mental Status   Oriented to person, place, and time. Attention: normal. Concentration: normal.   Speech: speech is normal   Level of consciousness: alert  Knowledge: good and consistent with education. Cranial Nerves   Cranial nerves II through XII intact. Motor Exam   Muscle bulk: normal  Overall muscle tone: normal  Right arm tone: normal  Left arm tone: normal  Right leg tone: normal  Left leg tone: normal    Strength   Strength 5/5 throughout.      Sensory Exam   Light touch normal.   Vibration normal.   Proprioception normal.   Pinprick normal.     Gait, Coordination, and Reflexes     Gait  Gait: normal    Coordination   Finger to nose coordination: normal  Heel to shin coordination: normal  Tandem walking coordination: normal    Tremor   Resting tremor: absent  Intention tremor: absent  Action tremor: absent    Reflexes   Right brachioradialis: 2+  Left brachioradialis: 2+  Right biceps: 2+  Left biceps: 2+  Right triceps: 2+  Left triceps: 2+  Right patellar: 2+  Left patellar: non-pharmacological measures as appropriate and evaluate response   Consider cultural, developmental and social influences on pain and pain management     Problem: Thermoregulation - [place]/Pediatrics  Goal: Maintains normal body temperature  Description: INTERVENTIONS:  1. Monitor temperature as ordered.  2. Monitor for signs of hypothermia or hyperthermia.  3. Provide thermal support measures.  4. Wean to open crib when appropriate per protocol.  Outcome: Progressing  Flowsheets (Taken 2020 011)  Maintains normal body temperature:   Monitor temperature, as ordered   Monitor for signs of hypothermia or hyperthermia   Provide thermal support measures     Problem: Safety Pediatric  Goal: Patient will remain safe during hospitalization  Description: INTERVENTIONS    1. Assess patient for fall risk and implement interventions if needed  2. Use safe transport techniques  3. Assess patient using the appropriate Fabian skin assessment scale  4. Assess patient for risk of aspiration  5. Assess patient for risk of elopement  6. Assess patient for risk of suicide  Outcome: Progressing  Flowsheets (Taken 2020 011)  Patient will remain safe durning hospitalization:   Assess patient for Fall Risk   Use safe transport   Assess Patient using the appropriate Fabian scale   Assess Patient for Aspirations   Assess Patient for Risk of Elopement     Problem: Discharge Planning  Goal: Discharge to home or other facility with appropriate resources  Description: INTERVENTIONS:  1. Identify and discuss barriers to discharge with patient and caregiver.  2. Arrange for needed discharge resources and transportation as appropriate.  3. Identify discharge learning needs (meds, wound care, etc).  4. Arrange for interpreters to assist at discharge as needed.  5. Refer to  for coordinating discharge planning if the patient needs post-hospital services based on physician order or complex needs related to functional  2+  Right achilles: 2+  Left achilles: 2+  Right : 2+  Left : 2+  Right plantar: normal  Left plantar: normal  Right ankle clonus: absent  Left ankle clonus: absent      ASSESSMENT/PLAN:       This is a 63 y.o. female who comes in for follow up for Trigeminal autonomic cephalgia, currently doing well no preventative treatment, only taking ibuprofen 800 mg as needed with good relief. She also has mild sleep apnea, but could not tolerate CPAP and had no improvement of her headaches with treatment. Neurologic exam normal. Since she is doing well, we'll have her follow-up annually. Advised patient to call the office if her symptoms change or worsen.         Riky Johnson MD status, cognitive ability or social support system.  Outcome: Progressing  Flowsheets (Taken 7/23/2020 0114)  Discharge to home or other facility with appropriate resources:   Identify and discuss barriers to discharge with patient and caregiver   Arrange for needed discharge resources and transportation as appropriate   Identify discharge learning needs (meds, wound care, etc)   Arrange for interpreters to assist at discharge as needed     Problem: Gastrointestinal - Pediatric  Goal: Minimal or absence of nausea and vomiting  Description: INTERVENTIONS:  1. Ensure adequate hydration  2. Monitor intake and output  3. Maintain NPO status until nausea and vomiting are resolved  4. Nasogastric tube to low intermittent suction as ordered  5. Administer ordered antiemetic medications as needed  6. Provide nonpharmacologic comfort measures as appropriate  7. Advance diet as ordered  8. Nutrition consult as indicated   Outcome: Progressing  Flowsheets (Taken 7/23/2020 0114)  Minimal or absence of nausea and vomiting:   Ensure adequate hydration   Monitor intake and output   Maintain NPO status until nausea and vomiting are resolved   Provide nonpharmacologic comfort measures as appropriate  Goal: Maintains or returns to baseline digestive function  Description: INTERVENTIONS:  1. Assess bowel function  2. Ensure adequate hydration  3. Administer ordered medications as needed  4. Encourage mobilization and activity  5. Nutrition consult as indicated  6. Assess hydration and nutritional status  7. Assess characteristics and frequency of stool  8. Monitor for metabolic panel imbalances  9. Assess for treatment effectiveness  Outcome: Progressing  Flowsheets (Taken 7/23/2020 0114)  Maintains or returns to baseline bowel function:   Assess bowel function   Ensure adequate hydration   Administer ordered medications as needed   Encourage mobilization and activity   Assess hydration and nutritional status   Assess characteristics  and frequency of stool   Monitor for metabolic panel imbalances   Assess for treatment effectiveness  Goal: Maintains adequate nutritional intake  Description: INTERVENTIONS:  1. Monitor percentage of each meal consumed  2. Identify factors contributing to decreased intake, treat as appropriate  3. Assist with meals as needed  4. Monitor I&O, weight and lab values  5. Obtain nutritional consult as indicated  6. Administer alternative nutrition interventions as ordered  Outcome: Progressing  Flowsheets (Taken 7/23/2020 0114)  Maintains adequate nutritional intake:   Monitor percentage of each meal consumed   Identify factors contributing to decreased intake, treat as appropriate   Monitor I&O, weight and lab values     Problem: Metabolic and Electrolytes - Pediatric  Goal: Electrolytes maintained within normal limits  Description: INTERVENTIONS:  1. Monitor labs and assess patient for signs and symptoms of electrolyte imbalances  2. Administer electrolyte replacement as ordered  3. Monitor response to electrolyte replacements, including repeat lab results as appropriate  4. Fluid restriction as ordered  5. Instruct patient on fluid and nutrition restrictions as appropriate  Outcome: Progressing  Flowsheets (Taken 7/23/2020 0114)  Electrolytes maintained within normal limits:   Monitor labs and assess patient for signs and symptoms of electrolyte imbalances   Administer electrolyte replacement as ordered   Monitor response to electrolyte replacements, including repeat lab results as appropriate  Goal: Maintain Optimal Renal Function and Hemodynamic Stability  Description: INTERVENTIONS:  1. Monitor labs and assess for signs and symptoms of volume excess or deficit  2. Monitor intake, output and patient weight  3. Monitor urine specific gravity, serum osmolarity and serum sodium as indicated or ordered  4. Monitor response to interventions for patient's volume status, including labs, urine output, blood pressure (other  measures as available)  5. Encourage oral intake as appropriate  6. Instruct patient on fluid and nutrition restrictions as appropriate  Outcome: Progressing  Flowsheets (Taken 7/23/2020 0114)  Maintain optimal renal function and [facility] stability:   Monitor labs and assess for signs and symptoms of volume excess or deficit   Monitor intake, output and patient weight   Monitor urine specific gravity, serum osmolarity and serum sodium as indicated or ordered   Monitor response to interventions for patient's volume status, including labs, urine output, blood pressure (other measures as available)   Encourage oral intake as appropriate

## 2020-08-06 RX ORDER — FLUTICASONE PROPIONATE 50 MCG
SPRAY, SUSPENSION (ML) NASAL
Qty: 16 G | Refills: 2 | Status: SHIPPED | OUTPATIENT
Start: 2020-08-06 | End: 2020-12-08 | Stop reason: SDUPTHER

## 2020-12-08 ENCOUNTER — OFFICE VISIT (OUTPATIENT)
Dept: PRIMARY CARE CLINIC | Age: 65
End: 2020-12-08
Payer: COMMERCIAL

## 2020-12-08 VITALS
DIASTOLIC BLOOD PRESSURE: 88 MMHG | SYSTOLIC BLOOD PRESSURE: 172 MMHG | WEIGHT: 145.2 LBS | OXYGEN SATURATION: 97 % | BODY MASS INDEX: 27.44 KG/M2 | HEART RATE: 100 BPM | TEMPERATURE: 97.3 F

## 2020-12-08 PROCEDURE — 3017F COLORECTAL CA SCREEN DOC REV: CPT | Performed by: PHYSICIAN ASSISTANT

## 2020-12-08 PROCEDURE — 1036F TOBACCO NON-USER: CPT | Performed by: PHYSICIAN ASSISTANT

## 2020-12-08 PROCEDURE — 99214 OFFICE O/P EST MOD 30 MIN: CPT | Performed by: PHYSICIAN ASSISTANT

## 2020-12-08 PROCEDURE — G8427 DOCREV CUR MEDS BY ELIG CLIN: HCPCS | Performed by: PHYSICIAN ASSISTANT

## 2020-12-08 PROCEDURE — G8419 CALC BMI OUT NRM PARAM NOF/U: HCPCS | Performed by: PHYSICIAN ASSISTANT

## 2020-12-08 PROCEDURE — G8484 FLU IMMUNIZE NO ADMIN: HCPCS | Performed by: PHYSICIAN ASSISTANT

## 2020-12-08 RX ORDER — FLUTICASONE PROPIONATE 50 MCG
1 SPRAY, SUSPENSION (ML) NASAL DAILY
Qty: 16 G | Refills: 3 | Status: SHIPPED | OUTPATIENT
Start: 2020-12-08

## 2020-12-08 RX ORDER — CETIRIZINE HYDROCHLORIDE 10 MG/1
10 TABLET ORAL DAILY
Qty: 30 TABLET | Refills: 3 | Status: SHIPPED | OUTPATIENT
Start: 2020-12-08 | End: 2021-04-20

## 2020-12-08 RX ORDER — CETIRIZINE HYDROCHLORIDE 10 MG/1
10 TABLET ORAL DAILY
Qty: 30 TABLET | Refills: 3 | Status: CANCELLED | OUTPATIENT
Start: 2020-12-08

## 2020-12-08 NOTE — PROGRESS NOTES
Visit Information    Have you changed or started any medications since your last visit including any over-the-counter medicines, vitamins, or herbal medicines? no   Are you having any side effects from any of your medications? -  no  Have you stopped taking any of your medications? Is so, why? -  no    Have you seen any other physician or provider since your last visit? No  Have you had any other diagnostic tests since your last visit? No  Have you been seen in the emergency room and/or had an admission to a hospital since we last saw you? No  Have you had your routine dental cleaning in the past 6 months? no    Have you activated your [employer] account? If not, what are your barriers?  Yes     Patient Care Team:  Raúl Cates PA-C as PCP - General (Physician Assistant)  Raúl Cates, MADHAVI as PCP - [facility]    Medical History Review  Past Medical, Family, and Social History reviewed and does not contribute to the patient presenting condition    Health Maintenance   Topic Date Due    DTaP/Tdap/Td vaccine (1 - Tdap) 03/04/1976    Shingles Vaccine (1 of 2) 03/04/2007    Colon cancer screen colonoscopy  03/04/2007    Breast cancer screen  03/10/2018    Flu vaccine (1) 09/01/2020    Cervical cancer screen  03/29/2021 (Originally 3/30/2018)    Lipid screen  06/13/2025    Hepatitis C screen  Completed    Hepatitis A vaccine  Aged Out    Hepatitis B vaccine  Aged Out    Hib vaccine  Aged Out    Meningococcal (ACWY) vaccine  Aged Out    Pneumococcal 0-64 years Vaccine  Aged Out    HIV screen  Discontinued

## 2020-12-08 NOTE — PROGRESS NOTES
office. Medication reviewed, refilled Zyrtec 10 mg, Flonase 50 mcg nasal spray. HPI    Patient Active Problem List   Diagnosis    Tracheobronchitis    Allergic rhinitis    Rhinitis, chronic    Eustachian tube disorder    Headache around the eyes    Nasal sinus congestion    Laryngotracheitis    Sleep disorder    Otalgia of right ear    Anxiety    Stress       Health Maintenance Due   Topic Date Due    Shingles Vaccine (1 of 2) 03/04/2007    Breast cancer screen  03/10/2018       Allergies   Allergen Reactions    Iodides      Other reaction(s): throat swelling, chest tightness    Keflex [Cephalexin] Other (See Comments)     Severe Back Pain         Current Outpatient Medications   Medication Sig Dispense Refill    fluticasone (FLONASE) 50 MCG/ACT nasal spray 1 spray by Nasal route daily 16 g 3    cetirizine (ZYRTEC) 10 MG tablet Take 1 tablet by mouth daily 30 tablet 3    ibuprofen (ADVIL;MOTRIN) 800 MG tablet Take 1 tablet by mouth 3 times daily as needed for Pain 90 tablet 0    Omega-3 Fatty Acids (OMEGA 3 PO) Take by mouth daily      CALCIUM PO Take by mouth daily      MULTIPLE VITAMIN PO Take by mouth daily      ranitidine (ZANTAC) 75 MG tablet Take 1 tablet by mouth 2 times daily (Patient not taking: Reported on 6/8/2020) 60 tablet 2     No current facility-administered medications for this visit. Social History     Tobacco Use    Smoking status: Never Smoker    Smokeless tobacco: Never Used   Substance Use Topics    Alcohol use: No    Drug use: No       Family History   Problem Relation Age of Onset    Heart Disease Mother     Diabetes Mother     Stroke Mother     High Blood Pressure Mother    Unk [place] Migraines Mother     Alzheimer's Disease Father         REVIEW OFSYSTEMS:  Review of Systems   Constitutional: Negative for chills and fever. HENT: Negative for congestion. Respiratory: Negative for cough, shortness of breath and wheezing.     Cardiovascular: Negative for chest pain. Gastrointestinal: Negative for constipation, diarrhea, nausea and vomiting. Genitourinary: Positive for frequency (d/t fluid intake). Negative for dysuria and urgency. Musculoskeletal: Negative for back pain, myalgias and neck pain. Neurological: Negative for headaches. PHYSICAL EXAM:  Vitals:    12/08/20 1506 12/08/20 1632   BP: (!) 173/89 (!) 172/88   Site: Left Upper Arm    Position: Sitting    Cuff Size: Medium Adult    Pulse: 102 100   Temp: 97.3 °F (36.3 °C)    TempSrc: Temporal    SpO2: 97%    Weight: 145 lb 3.2 oz (65.9 kg)      BP Readings from Last 3 Encounters:   12/08/20 (!) 172/88   06/08/20 134/71   09/24/19 123/69        Physical Exam  Vitals signs reviewed. Constitutional:       Appearance: Normal appearance. She is well-developed, well-groomed and overweight. HENT:      Head: Normocephalic and atraumatic. Right Ear: External ear normal.      Left Ear: External ear normal.      Nose: Nose normal.   Eyes:      General: Lids are normal.      Conjunctiva/sclera: Conjunctivae normal.      Pupils: Pupils are equal, round, and reactive to light. Cardiovascular:      Rate and Rhythm: Normal rate and regular rhythm. Heart sounds: Normal heart sounds. Pulmonary:      Effort: Pulmonary effort is normal.      Breath sounds: Normal breath sounds. Abdominal:      Palpations: Abdomen is soft. There is no mass. Tenderness: There is no abdominal tenderness. Musculoskeletal:         General: No tenderness. Skin:     General: Skin is warm and dry. Neurological:      Mental Status: She is alert and oriented to person, place, and time. Psychiatric:         Behavior: Behavior is cooperative.            DIAGNOSTIC FINDINGS:  CBC:  Lab Results   Component Value Date    WBC 4.6 06/13/2020    HGB 13.2 06/13/2020     06/13/2020       BMP:    Lab Results   Component Value Date     06/13/2020    K 4.2 06/13/2020     06/13/2020    CO2 22 06/13/2020 BUN 16 06/13/2020    CREATININE 0.69 06/13/2020    GLUCOSE 85 06/13/2020       HEMOGLOBIN A1C: No results found for: LABA1C    FASTING LIPID PANEL:  Lab Results   Component Value Date    CHOL 198 06/13/2020    HDL 53 06/13/2020    TRIG 152 (H) 06/13/2020       ASSESSMENT AND PLAN:  Brian Lang was seen today for follow-up. Diagnoses and all orders for this visit:    Elevated blood pressure reading    Grief reaction    Hypertriglyceridemia    Hypernatremia    Hyperchloremia    Overweight (BMI 25.0-29. 9)    Medication refill    Sensation of fullness in right ear  -     fluticasone (FLONASE) 50 MCG/ACT nasal spray; 1 spray by Nasal route daily  -     cetirizine (ZYRTEC) 10 MG tablet; Take 1 tablet by mouth daily      FOLLOW UP AND INSTRUCTIONS:  Return in about 5 months (around 5/8/2021) for Physical.    · Marta received counseling on the following healthy behaviors:nutrition    · Discussed use, benefit, and side effects of prescribed medications. Barriers to medication compliance addressed. All patient questions answered. Pt voiced understanding. · Patient given educational materials - see patient instructions    Electronically signed by Roxane Mo PA-C on 12/8/20 at 4:11 PM EST    This note is created with the assistance of a speech-recognition program. While intending to generate a document that actually reflects the content of the visit, the document can still have some mistakes which may not have been identified and corrected by editing.

## 2020-12-11 ASSESSMENT — ENCOUNTER SYMPTOMS
BACK PAIN: 0
WHEEZING: 0
DIARRHEA: 0
CONSTIPATION: 0
NAUSEA: 0
VOMITING: 0
SHORTNESS OF BREATH: 0
COUGH: 0

## 2020-12-13 ENCOUNTER — APPOINTMENT (OUTPATIENT)
Dept: GENERAL RADIOLOGY | Age: 65
End: 2020-12-13
Payer: COMMERCIAL

## 2020-12-13 ENCOUNTER — HOSPITAL ENCOUNTER (EMERGENCY)
Age: 65
Discharge: HOME | End: 2020-12-13
Attending: EMERGENCY MEDICINE
Payer: COMMERCIAL

## 2020-12-13 VITALS
WEIGHT: 130 LBS | BODY MASS INDEX: 24.55 KG/M2 | DIASTOLIC BLOOD PRESSURE: 81 MMHG | HEIGHT: 61 IN | OXYGEN SATURATION: 95 % | TEMPERATURE: 98.2 F | RESPIRATION RATE: 18 BRPM | HEART RATE: 109 BPM | SYSTOLIC BLOOD PRESSURE: 133 MMHG

## 2020-12-13 PROBLEM — R05.9 COUGH: Status: ACTIVE | Noted: 2020-12-13

## 2020-12-13 PROBLEM — R05.8 DRY COUGH: Status: ACTIVE | Noted: 2020-12-13

## 2020-12-13 PROCEDURE — 99282 EMERGENCY DEPT VISIT SF MDM: CPT

## 2020-12-13 PROCEDURE — 6360000002 HC RX W HCPCS: Performed by: STUDENT IN AN ORGANIZED HEALTH CARE EDUCATION/TRAINING PROGRAM

## 2020-12-13 PROCEDURE — 71045 X-RAY EXAM CHEST 1 VIEW: CPT

## 2020-12-13 RX ORDER — DEXAMETHASONE SODIUM PHOSPHATE 10 MG/ML
8 INJECTION INTRAMUSCULAR; INTRAVENOUS ONCE
Status: COMPLETED | OUTPATIENT
Start: 2020-12-13 | End: 2020-12-13

## 2020-12-13 RX ORDER — BENZONATATE 100 MG/1
100 CAPSULE ORAL 2 TIMES DAILY PRN
Qty: 14 CAPSULE | Refills: 0 | Status: SHIPPED | OUTPATIENT
Start: 2020-12-13 | End: 2020-12-21 | Stop reason: SDUPTHER

## 2020-12-13 RX ORDER — CODEINE PHOSPHATE AND GUAIFENESIN 10; 100 MG/5ML; MG/5ML
5 SOLUTION ORAL EVERY 4 HOURS PRN
Status: DISCONTINUED | OUTPATIENT
Start: 2020-12-13 | End: 2020-12-13

## 2020-12-13 RX ADMIN — DEXAMETHASONE SODIUM PHOSPHATE 8 MG: 10 INJECTION INTRAMUSCULAR; INTRAVENOUS at 20:25

## 2020-12-13 ASSESSMENT — ENCOUNTER SYMPTOMS
SHORTNESS OF BREATH: 0
CHOKING: 0
VOMITING: 0
NAUSEA: 0
CHEST TIGHTNESS: 0
SORE THROAT: 0
DIARRHEA: 0
ABDOMINAL PAIN: 0
COUGH: 1
TROUBLE SWALLOWING: 0
WHEEZING: 0
FACIAL SWELLING: 0
RHINORRHEA: 0

## 2020-12-14 ENCOUNTER — TELEPHONE (OUTPATIENT)
Dept: PRIMARY CARE CLINIC | Age: 65
End: 2020-12-14

## 2020-12-14 ENCOUNTER — CARE COORDINATION (OUTPATIENT)
Dept: CARE COORDINATION | Age: 65
End: 2020-12-14

## 2020-12-14 RX ORDER — GUAIFENESIN 600 MG/1
600 TABLET, EXTENDED RELEASE ORAL 2 TIMES DAILY
Qty: 20 TABLET | Refills: 0 | Status: SHIPPED | OUTPATIENT
Start: 2020-12-14 | End: 2020-12-24

## 2020-12-14 RX ORDER — IBUPROFEN 800 MG/1
800 TABLET ORAL 3 TIMES DAILY PRN
Qty: 90 TABLET | Refills: 0 | Status: SHIPPED | OUTPATIENT
Start: 2020-12-14 | End: 2021-05-24

## 2020-12-14 NOTE — ED PROVIDER NOTES
[facility]  Emergency Department  Faculty Attestation     I performed a history and physical examination of the patient and discussed management with the resident. I reviewed the residents note and agree with the documented findings and plan of care. Any areas of disagreement are noted on the chart. I was personally present for the key portions of any procedures. I have documented in the chart those procedures where I was not present during the key portions. I have reviewed the emergency nurses triage note. I agree with the chief complaint, past medical history, past surgical history, allergies, medications, social and family history as documented unless otherwise noted below. For Physician Assistant/ Nurse Practitioner cases/documentation I have personally evaluated this patient and have completed at least one if not all key elements of the E/M (history, physical exam, and MDM). Additional findings are as noted. Primary Care Physician:  John Lutz PA-C    Screenings:  [unfilled]    CHIEF COMPLAINT       Chief Complaint   Patient presents with    Cough       RECENT VITALS:   Temp: 98.2 °F (36.8 °C),  Pulse: 109, Resp: 18, BP: 133/81    LABS:  Labs Reviewed - No data to display    Radiology  XR CHEST PORTABLE    (Results Pending)       Attending Physician Additional  Notes    Patient is had 2 days of dry cough and nasal congestion. No fevers. No myalgias loss of smell/taste, fatigue, Covid symptoms or exposures. No use of ACE inhibitor blood pressure medications. She is a non-smoker. She had some improvement with Zyrtec. She is previously used Tessalon. No history of asthma. She declines bronchodilators since a family member had adverse events from these. On exam she is nontoxic initially tachycardic and this is resolved. Lungs are clear. She does have dry cough. Normal pulses. No edema cords Homans calf tenderness. No JVD or gallop. Oropharynx is benign and no angioedema noted. Impression is cough, consider bronchospasm, probable viral URI, less likely Covid. Plan is Decadron, chest x-ray, anticipate discharge home on Tessalon, Covid precautions, follow-up. Veronica Malhotra.  Mahin Jalloh MD, [facility]  Attending Emergency  Physician               Ruben Shin MD  12/13/20 2013

## 2020-12-14 NOTE — TELEPHONE ENCOUNTER
Patient called stated she has increasing cough with a lot of phlegm and mucus. She stated she now has a bad headache because of all the coughing she is doing. Patient wants to know if you can call in something for her cough and some motrin for her headache.  Please advise      Health Maintenance   Topic Date Due    Shingles Vaccine (1 of 2) 03/04/2007    Breast cancer screen  03/10/2018    Cervical cancer screen  03/29/2021 (Originally 3/30/2018)    Flu vaccine (1) 12/08/2021 (Originally 9/1/2020)    Lipid screen  06/13/2025    Colon cancer screen colonoscopy  06/22/2030    DTaP/Tdap/Td vaccine (2 - Tdap) 09/29/2030    Hepatitis C screen  Completed    Hepatitis A vaccine  Aged Out    Hepatitis B vaccine  Aged Out    Hib vaccine  Aged Out    Meningococcal (ACWY) vaccine  Aged Out    Pneumococcal 0-64 years Vaccine  Aged Out    HIV screen  Discontinued             (applicable per patient's age: Cancer Screenings, Depression Screening, Fall Risk Screening, Immunizations)    LDL Cholesterol (mg/dL)   Date Value   06/13/2020 115     AST (U/L)   Date Value   06/13/2020 17     ALT (U/L)   Date Value   06/13/2020 10     BUN (mg/dL)   Date Value   06/13/2020 16      (goal A1C is < 7)   (goal LDL is <100) need 30-50% reduction from baseline     BP Readings from Last 3 Encounters:   12/13/20 133/81   12/08/20 (!) 172/88   06/08/20 134/71    (goal /80)      All Future Testing planned in [facility]:  Lab Frequency Next Occurrence       Next Visit Date:  Future Appointments   Date Time Provider Aiden Allen   5/10/2021  2:40 PM Michi Bryson PA-C [address]            Patient Active Problem List:     Tracheobronchitis     Allergic rhinitis     Rhinitis, chronic     Eustachian tube disorder     Headache around the eyes     Nasal sinus congestion     Laryngotracheitis     Sleep disorder     Otalgia of right ear     Anxiety     Stress     Dry cough

## 2020-12-14 NOTE — ED PROVIDER NOTES
[address]  ED  Emergency Department Encounter  EmergencyMedicine Resident     Pt Name:Marta Parks  MRN: [id number]  [place] 1955  Date of evaluation: 12/13/20  PCP:  [address]       Chief Complaint   Patient presents with    Cough       HISTORY OF PRESENT ILLNESS  (Location/Symptom, Timing/Onset, Context/Setting, Quality, Duration, Modifying Factors, Severity.)      Rosanna Alicea is a 63 y.o. never smoker, female who presents with nonbloody clear mildly productive - dry cough for 3 days in the context of coughing forcefully that patient gets facial pain and a headache from the cough. Patient denies recent travel, smoke exposure, sick contacts. Cough is improved with Robitussin and Zyrtek. Denies fever, chills, myalgias, arthralgias, abdominal pain, chest pain. PAST MEDICAL / SURGICAL / SOCIAL / FAMILY HISTORY      has a past medical history of Acid reflux, Allergic rhinitis, Anemia, Anxiety, Headache around the eyes, Hypoglycemia, Obesity, and Stroke ([facility] [address] has no past surgical history on file.     Social History     Socioeconomic History    Marital status:      Spouse name: Not on file    Number of children: Not on file    Years of education: Not on file    Highest education level: Not on file   Occupational History    Not on file   Social Needs    Financial resource strain: Not hard at all    Food insecurity     Worry: Never true     Inability: Never true   [employer] needs     Medical: Not on file     Non-medical: Not on file   Tobacco Use    Smoking status: Never Smoker    Smokeless tobacco: Never Used   Substance and Sexual Activity    Alcohol use: No    Drug use: No    Sexual activity: Not on file   Lifestyle    Physical activity     Days per week: Not on file     Minutes per session: Not on file    Stress: Not on file   Relationships    Social connections     Talks on phone: Not on file     Gets together: Not on file     Attends Muslim service: Not on file     Active member of club or organization: Not on file     Attends meetings of clubs or organizations: Not on file     Relationship status: Not on file    Intimate partner violence     Fear of current or ex partner: Not on file     Emotionally abused: Not on file     Physically abused: Not on file     Forced sexual activity: Not on file   Other Topics Concern    Not on file   Social History Narrative    Not on file       Family History   Problem Relation Age of Onset    Heart Disease Mother     Diabetes Mother     Stroke Mother     High Blood Pressure Mother    [facility] Migraines Mother     Alzheimer's Disease Father        Allergies: Iodides and Keflex [cephalexin]    Home Medications:  Prior to Admission medications    Medication Sig Start Date End Date Taking? Authorizing Provider   benzonatate (TESSALON) 100 MG capsule Take 1 capsule by mouth 2 times daily as needed for Cough 12/13/20 12/20/20 Yes Cherie Hart MD   fluticasone (FLONASE) 50 MCG/ACT nasal spray 1 spray by Nasal route daily 12/8/20   Raúl Degree, PA-C   cetirizine (ZYRTEC) 10 MG tablet Take 1 tablet by mouth daily 12/8/20   Raúl Degree, PA-C   ibuprofen (ADVIL;MOTRIN) 800 MG tablet Take 1 tablet by mouth 3 times daily as needed for Pain 7/25/19   Raúl Degree, PA-C   ranitidine (ZANTAC) 75 MG tablet Take 1 tablet by mouth 2 times daily  Patient not taking: Reported on 6/8/2020 11/21/18   Raúl Degree, PA-C   Omega-3 Fatty Acids (OMEGA 3 PO) Take by mouth daily    Historical Provider, MD   CALCIUM PO Take by mouth daily    Historical Provider, MD   MULTIPLE VITAMIN PO Take by mouth daily    Historical Provider, MD       REVIEW OF SYSTEMS    (2-9 systems for level 4, 10 or more for level 5)      Review of Systems   Constitutional: Negative for activity change, appetite change, chills, fatigue and fever.    HENT: Negative for congestion, facial swelling, rhinorrhea, sore throat and trouble swallowing. Respiratory: Positive for cough. Negative for choking, chest tightness, shortness of breath and wheezing. Cardiovascular: Negative for chest pain, palpitations and leg swelling. Gastrointestinal: Negative for abdominal pain, diarrhea, nausea and vomiting. Musculoskeletal: Negative for arthralgias, myalgias, neck pain and neck stiffness. Neurological: Negative for dizziness. Psychiatric/Behavioral: Negative for sleep disturbance. PHYSICAL EXAM   (up to 7 for level 4, 8 or more for level 5)      INITIAL VITALS:   /81   Pulse 109   Temp 98.2 °F (36.8 °C) (Infrared)   Resp 18   Ht 5' 1\" (1.549 m)   Wt 130 lb (59 kg)   LMP  (LMP Unknown)   SpO2 95%   BMI 24.56 kg/m²     Physical Exam  Constitutional:       General: She is not in acute distress. Appearance: Normal appearance. She is normal weight. She is not ill-appearing, toxic-appearing or diaphoretic. HENT:      Head: Normocephalic. Right Ear: External ear normal.      Left Ear: External ear normal.      Nose: Nose normal.      Mouth/Throat:      Mouth: Mucous membranes are dry. Pharynx: Oropharynx is clear. No oropharyngeal exudate or posterior oropharyngeal erythema. Eyes:      General: No scleral icterus. Conjunctiva/sclera: Conjunctivae normal.   Cardiovascular:      Rate and Rhythm: Normal rate and regular rhythm. Pulses: Normal pulses. Heart sounds: Normal heart sounds. No murmur. Pulmonary:      Effort: Pulmonary effort is normal. No respiratory distress. Breath sounds: Normal breath sounds. No stridor. No wheezing, rhonchi or rales. Chest:      Chest wall: No tenderness. Neurological:      Mental Status: She is alert. Psychiatric:         Behavior: Behavior normal. Behavior is cooperative. Thought Content:  Thought content normal.         Cognition and Memory: Memory normal.         Judgment: Judgment normal.      Comments: Appears anxious and depressed regarding the death of her . DIFFERENTIAL  DIAGNOSIS     PLAN (LABS / IMAGING / EKG):  Orders Placed This Encounter   Procedures    XR CHEST PORTABLE       MEDICATIONS ORDERED:  Orders Placed This Encounter   Medications    DISCONTD: guaiFENesin-codeine (GUAIFENESIN AC) 100-10 MG/5ML liquid 5 mL    dexamethasone (DECADRON) injection 8 mg    benzonatate (TESSALON) 100 MG capsule     Sig: Take 1 capsule by mouth 2 times daily as needed for Cough     Dispense:  14 capsule     Refill:  0       DDX: URI, upper airway cough syndrome, obstructive/restrictive lung disease, GERD, post nasal drip, Allergic bronchopulmonary aspergillosis, acute bronchitis or acute pharyngitis, Pneumonia, Coronavirus    DIAGNOSTIC RESULTS / EMERGENCY DEPARTMENT COURSE / MDM     LABS:  No results found for this visit on 12/13/20. IMPRESSION: cough    RADIOLOGY:  CXR - no indicative of PNA    EKG  None    All EKG's are interpreted by the Emergency Department Physician who either signs or Co-signs this chart in the absence of a cardiologist.    EMERGENCY DEPARTMENT COURSE:  65F with acute nonbloody cough. CXR - results pending. Tessalon on dispo to home. PCP follow-up this week.          PROCEDURES:   Procedures    CONSULTS:  None    CRITICAL CARE:  None    FINAL IMPRESSION      cough    DISPOSITION / PLAN     DISPOSITION    Home    PATIENT REFERRED TO:  Arron Barragan PA-C  [address] [place] [address]  [phone]    Schedule an appointment as soon as possible for a visit in 3 days  Follow up within 3 days, Return to ED sooner if symptoms worsen    [facility]  [facility]  [phone]    If symptoms worsen      DISCHARGE MEDICATIONS:  New Prescriptions    BENZONATATE (TESSALON) 100 MG CAPSULE    Take 1 capsule by mouth 2 times daily as needed for Cough       Devendra Cotton MD  Family Medicine Resident    (Please note that portions ofthis note were completed with a voice recognition program.  Efforts were made to edit the dictations but occasionally words are mis-transcribed.)        Antwon Rodriguez MD  Resident  12/13/20 2001       Antwon Rodriguez MD  Resident  12/13/20 2002       Antwon Rodriguez MD  Resident  12/13/20 2012      Case and plan reviewed and discussed with Dr. Tiffany Warren.      Antwon Rodriguez MD  Resident  12/13/20 [id number]

## 2020-12-21 ENCOUNTER — TELEPHONE (OUTPATIENT)
Dept: PRIMARY CARE CLINIC | Age: 65
End: 2020-12-21

## 2020-12-21 RX ORDER — BENZONATATE 100 MG/1
100-200 CAPSULE ORAL 3 TIMES DAILY PRN
Qty: 30 CAPSULE | Refills: 0 | Status: SHIPPED | OUTPATIENT
Start: 2020-12-21 | End: 2020-12-31

## 2021-05-24 ENCOUNTER — OFFICE VISIT (OUTPATIENT)
Dept: FAMILY MEDICINE CLINIC | Age: 66
End: 2021-05-24
Payer: COMMERCIAL

## 2021-05-24 VITALS
OXYGEN SATURATION: 98 % | HEART RATE: 70 BPM | DIASTOLIC BLOOD PRESSURE: 84 MMHG | WEIGHT: 143 LBS | TEMPERATURE: 97.3 F | BODY MASS INDEX: 27.02 KG/M2 | SYSTOLIC BLOOD PRESSURE: 137 MMHG

## 2021-05-24 DIAGNOSIS — Z13.29 THYROID DISORDER SCREEN: ICD-10-CM

## 2021-05-24 DIAGNOSIS — Z12.31 ENCOUNTER FOR SCREENING MAMMOGRAM FOR MALIGNANT NEOPLASM OF BREAST: ICD-10-CM

## 2021-05-24 DIAGNOSIS — Z13.220 SCREENING CHOLESTEROL LEVEL: ICD-10-CM

## 2021-05-24 DIAGNOSIS — Z00.00 ENCOUNTER FOR WELL ADULT EXAM WITHOUT ABNORMAL FINDINGS: Primary | ICD-10-CM

## 2021-05-24 DIAGNOSIS — E55.9 VITAMIN D DEFICIENCY: ICD-10-CM

## 2021-05-24 PROBLEM — R05.8 DRY COUGH: Status: RESOLVED | Noted: 2020-12-13 | Resolved: 2021-05-24

## 2021-05-24 PROCEDURE — 99396 PREV VISIT EST AGE 40-64: CPT | Performed by: NURSE PRACTITIONER

## 2021-05-24 RX ORDER — ACETAMINOPHEN 160 MG
1 TABLET,DISINTEGRATING ORAL DAILY
Qty: 90 CAPSULE | Refills: 3 | Status: SHIPPED | OUTPATIENT
Start: 2021-05-24

## 2021-05-24 ASSESSMENT — PATIENT HEALTH QUESTIONNAIRE - PHQ9
1. LITTLE INTEREST OR PLEASURE IN DOING THINGS: 0
SUM OF ALL RESPONSES TO PHQ QUESTIONS 1-9: 0
2. FEELING DOWN, DEPRESSED OR HOPELESS: 0

## 2021-05-24 NOTE — PROGRESS NOTES
Pedro Bradley, FNP-C  [address]  [address] [address] [facility] [address] Mariola Mansi  [facility]  [phone]  [phone]    Karolina Wagner is a 61 y.o. female who is here with c/o of:    Chief Complaint: New Patient      Patient Accompanied by: n/a    HPI - Karolina Wagner is here today for the following: establishing care and wellness exam - no complaints at this time    Patient is using CPAP for ATA    Patient reports stress/anxiety in the past d/t her 's illness and death, but no current problem      Patient Active Problem List   Diagnosis    Rhinitis, chronic    Eustachian tube disorder     Past Medical History:   Diagnosis Date    Acid reflux     Allergic rhinitis     Anemia     Anxiety 10/18/2016    Headache around the eyes 09/14/2015    Hypoglycemia     Obesity     Sleep disorder 7/11/2016    Polysomnogram in September 2016 with mild obstructive sleep apnea. Treated with CPAP at 8 cm water.  Stress 10/18/2016    Stroke [facility])       History reviewed. No pertinent surgical history. Family History   Problem Relation Age of Onset    Heart Disease Mother     Diabetes Mother     Stroke Mother     High Blood Pressure Mother    Torin Fontanez Migraines Mother     Alzheimer's Disease Father      Social History     Tobacco Use    Smoking status: Never Smoker    Smokeless tobacco: Never Used   Substance Use Topics    Alcohol use: No     ALLERGIES:    Allergies   Allergen Reactions    Iodides      Other reaction(s): throat swelling, chest tightness    Keflex [Cephalexin] Other (See Comments)     Severe Back Pain          Subjective     · Constitutional:  Negative for activity change, appetite change,unexpected weight change, chills, fever, and fatigue. · HENT: Negative for ear pain, sore throat,  Rhinorrhea, sinus pain, sinus pressure, congestion. · Eyes:  Negative for pain and discharge. · Respiratory:  Negative for chest tightness, shortness of breath, wheezing, and cough.     · Cardiovascular:  Negative for chest pain, palpitations and leg swelling. · Gastrointestinal: Negative for abdominal pain, blood in stool, constipation,diarrhea, nausea and vomiting. · Endocrine: Negative for cold intolerance, heat intolerance, polydipsia, polyphagia and polyuria. · Genitourinary: Negative for difficulty urinating, dysuria, flank pain, frequency, hematuria and urgency. · Musculoskeletal: Negative for arthralgias, back pain, joint swelling, myalgias, neck pain and neck stiffness. · Skin: Negative for rash and wound. · Allergic/Immunologic: Negative for environmental allergies and food allergies. · Neurological:  Negative for dizziness, light-headedness, numbness and headaches. · Hematological:  Negative for adenopathy. Does not bruise/bleed easily. · Psychiatric/Behavioral: Negative for self-injury, sleep disturbance and suicidal ideas. Objective     PHYSICAL EXAM:   · Constitutional: Adrienne Guzman is oriented to person, place, and time. Vital signs are normal. Appears well-developed and well-nourished. · HEENT:   · Head: Normocephalic and atraumatic. Right Ear: Hearing and external ear normal. TM normal  Canal normal  · Left Ear: Hearing and external ear normal. TM normal Canal normal  · Nose: Nares normal. Septum midline. No drainage or sinus tenderness. Mucosa pink and moist  · Mouth/Throat: Oropharynx- No erythema, no exudate. Uvula midline, no erythema, no edema. Mucous membranes are pink and moist.   · Eyes:PERRL, EOMI, Conjunctiva normal, No discharge. · Neck: Full passive range of motion. Non-tender on palpation. Neck supple. No thyromegaly present. Trachea normal.  · Cardiovascular: Normal rate, regular rhythm, S1, S2, no murmur, no gallop, no friction rub, intact distal pulses. No carotid bruit. No lower extremity edema  · Pulmonary/Chest: Breath sounds are clear throughout, No respiratory distress, No wheezing, No chest tenderness.  Effort normal  · Thyroid disorder screen  - TSH; Future  - T4, Free; Future    5. Screening cholesterol level  - Lipid, Fasting; Future      Return in about 1 year (around 5/24/2022). 1.  Marta received counseling on the following healthy behaviors: nutrition, exercise and medication adherence  2. Patient given educational materials - see patient instructions  3. Was a self-tracking handout given in paper form or via [employer]? No  If yes, see orders or list here. 4.  Discussed use, benefit, and side effects of prescribed medications. Barriers to medication compliance addressed. All patient questions answered. Pt voiced understanding. 5.  Reviewed prior labs, imaging, consultation, follow up, and health maintenance  6. Continue current medications, diet and exercise. 7. Discussed use, benefit, and side effects of prescribed medications. Barriers to medication compliance addressed. All her questions were answered. Pt voiced understanding. Marta will continue current medications, diet and exercise. Of the 30 minute duration appointment visit, Sheryl Stoll CNP spent at least 50% of the face-to-face time in counseling, explanation of diagnosis, planning of further management, and answering all questions. Signed:  Sheryl Stoll CNP    This note is created with the assistance of a speech-recognition program.  While intending to generate a document that actually reflects the content of the visit, no guarantees can be provided that every mistake has been identified and corrected by editing.

## 2021-08-18 ENCOUNTER — TELEPHONE (OUTPATIENT)
Dept: FAMILY MEDICINE CLINIC | Age: 66
End: 2021-08-18

## 2023-05-26 ENCOUNTER — OFFICE VISIT (OUTPATIENT)
Dept: PRIMARY CARE CLINIC | Age: 68
End: 2023-05-26
Payer: MEDICARE

## 2023-05-26 VITALS
SYSTOLIC BLOOD PRESSURE: 162 MMHG | TEMPERATURE: 97.9 F | OXYGEN SATURATION: 97 % | BODY MASS INDEX: 26.43 KG/M2 | DIASTOLIC BLOOD PRESSURE: 82 MMHG | HEART RATE: 65 BPM | WEIGHT: 140 LBS | HEIGHT: 61 IN

## 2023-05-26 DIAGNOSIS — H69.83 EUSTACHIAN TUBE DYSFUNCTION, BILATERAL: ICD-10-CM

## 2023-05-26 DIAGNOSIS — J40 BRONCHITIS: Primary | ICD-10-CM

## 2023-05-26 DIAGNOSIS — J04.0 LARYNGITIS: ICD-10-CM

## 2023-05-26 PROCEDURE — 3017F COLORECTAL CA SCREEN DOC REV: CPT

## 2023-05-26 PROCEDURE — 99213 OFFICE O/P EST LOW 20 MIN: CPT

## 2023-05-26 PROCEDURE — 1123F ACP DISCUSS/DSCN MKR DOCD: CPT

## 2023-05-26 PROCEDURE — G8427 DOCREV CUR MEDS BY ELIG CLIN: HCPCS

## 2023-05-26 PROCEDURE — G8400 PT W/DXA NO RESULTS DOC: HCPCS

## 2023-05-26 PROCEDURE — G8419 CALC BMI OUT NRM PARAM NOF/U: HCPCS

## 2023-05-26 PROCEDURE — 1090F PRES/ABSN URINE INCON ASSESS: CPT

## 2023-05-26 PROCEDURE — 1036F TOBACCO NON-USER: CPT

## 2023-05-26 RX ORDER — ALBUTEROL SULFATE 90 UG/1
AEROSOL, METERED RESPIRATORY (INHALATION)
COMMUNITY
Start: 2023-05-09

## 2023-05-26 RX ORDER — CHLORHEXIDINE GLUCONATE 0.12 MG/ML
RINSE ORAL
COMMUNITY

## 2023-05-26 RX ORDER — DOXYCYCLINE HYCLATE 100 MG
100 TABLET ORAL 2 TIMES DAILY
Qty: 14 TABLET | Refills: 0 | Status: SHIPPED | OUTPATIENT
Start: 2023-05-26 | End: 2023-06-02

## 2023-05-26 RX ORDER — GUAIFENESIN AND CODEINE PHOSPHATE 100; 10 MG/5ML; MG/5ML
5 SOLUTION ORAL 3 TIMES DAILY PRN
Qty: 105 ML | Refills: 0 | Status: SHIPPED | OUTPATIENT
Start: 2023-05-26 | End: 2023-06-02

## 2023-05-26 RX ORDER — PREDNISONE 10 MG/1
40 TABLET ORAL DAILY
Qty: 20 TABLET | Refills: 0 | Status: SHIPPED | OUTPATIENT
Start: 2023-05-26 | End: 2023-05-31

## 2023-05-26 ASSESSMENT — ENCOUNTER SYMPTOMS
SHORTNESS OF BREATH: 0
COUGH: 1
SORE THROAT: 1
RHINORRHEA: 1
VOICE CHANGE: 1
WHEEZING: 0
EYE DISCHARGE: 0

## 2023-05-26 NOTE — PATIENT INSTRUCTIONS
Finish the entire course of antibiotic treatment. Complete short course of oral steroids. Push hydration. May use cough medication as needed. Continue using albuterol inhaler as needed for coughing spells. Follow-up if worsening or no improvement.

## 2023-05-26 NOTE — PROGRESS NOTES
[facility]  [address] [facility] [address]    [address] WALK IN CARE  [address] [place] [address] [address] [address]  Dept: [phone]    Vin Cruz is a 79 y.o. female Established patient, who presents to the walk-in clinic today with conditions/complaints as noted below:    Chief Complaint   Patient presents with    Cough     Started a couple wks ago         HPI:     Patient is a 61-year-old female that presents today for a persistent cough. Reports that she's been dealing with the cough for a couple of weeks now that's productive on-and-off. She was evaluated at an urgent care on 5/12 shortly after it started and treated for a viral URI with Tessalon which didn't help. Notes that she found an old prescription of Bromfed that she started taking which provided some relief but eventually ran out. She was also given an Albuterol inhalerStates that it seemed to be getting better at one point and then worsened. Reports associated fatigue, runny nose/congestion, post-nasal drainage, intermittent ear pain, occasional left-sided throat discomfort, and voice hoarseness. Talking seems to cause \"coughing spells\". No history of asthma or COPD. Denies any fevers, shortness of breath, wheezing, or chest pain. Past Medical History:   Diagnosis Date    Acid reflux     Allergic rhinitis     Anemia     Anxiety 10/18/2016    Headache around the eyes 09/14/2015    Hypoglycemia     Obesity     Sleep disorder 7/11/2016    Polysomnogram in September 2016 with mild obstructive sleep apnea. Treated with CPAP at 8 cm water.     Stress 10/18/2016    Stroke [facility])        Current Outpatient Medications   Medication Sig Dispense Refill    albuterol sulfate HFA (PROVENTIL;VENTOLIN;PROAIR) 108 (90 Base) MCG/ACT inhaler       doxycycline hyclate (VIBRA-TABS) 100 MG tablet Take 1 tablet